# Patient Record
Sex: MALE | Race: WHITE | HISPANIC OR LATINO | Employment: OTHER | URBAN - METROPOLITAN AREA
[De-identification: names, ages, dates, MRNs, and addresses within clinical notes are randomized per-mention and may not be internally consistent; named-entity substitution may affect disease eponyms.]

---

## 2018-12-13 ENCOUNTER — CLINICAL SUPPORT (OUTPATIENT)
Dept: URGENT CARE | Facility: CLINIC | Age: 43
End: 2018-12-13

## 2018-12-13 DIAGNOSIS — Z02.1 ENCOUNTER FOR PRE-EMPLOYMENT EXAMINATION: ICD-10-CM

## 2018-12-13 PROCEDURE — 99499 UNLISTED E&M SERVICE: CPT | Mod: S$GLB,,, | Performed by: NURSE PRACTITIONER

## 2021-10-03 ENCOUNTER — HOSPITAL ENCOUNTER (EMERGENCY)
Facility: HOSPITAL | Age: 46
Discharge: HOME | End: 2021-10-03
Attending: EMERGENCY MEDICINE
Payer: COMMERCIAL

## 2021-10-03 VITALS
DIASTOLIC BLOOD PRESSURE: 99 MMHG | BODY MASS INDEX: 26.82 KG/M2 | SYSTOLIC BLOOD PRESSURE: 164 MMHG | WEIGHT: 161 LBS | OXYGEN SATURATION: 98 % | HEART RATE: 83 BPM | HEIGHT: 65 IN | RESPIRATION RATE: 17 BRPM | TEMPERATURE: 98 F

## 2021-10-03 DIAGNOSIS — S61.209A FLEXOR TENDON LACERATION OF FINGER WITH OPEN WOUND, INITIAL ENCOUNTER: ICD-10-CM

## 2021-10-03 DIAGNOSIS — S61.213A LACERATION OF LEFT MIDDLE FINGER WITHOUT FOREIGN BODY WITHOUT DAMAGE TO NAIL, INITIAL ENCOUNTER: Primary | ICD-10-CM

## 2021-10-03 DIAGNOSIS — S56.129A FLEXOR TENDON LACERATION OF FINGER WITH OPEN WOUND, INITIAL ENCOUNTER: ICD-10-CM

## 2021-10-03 PROCEDURE — 25000003 PHARM REV CODE 250: Performed by: NURSE PRACTITIONER

## 2021-10-03 PROCEDURE — 63600175 PHARM REV CODE 636 W HCPCS: Performed by: NURSE PRACTITIONER

## 2021-10-03 PROCEDURE — 25000003 PHARM REV CODE 250

## 2021-10-03 PROCEDURE — 90715 TDAP VACCINE 7 YRS/> IM: CPT | Performed by: NURSE PRACTITIONER

## 2021-10-03 PROCEDURE — 90471 IMMUNIZATION ADMIN: CPT | Performed by: NURSE PRACTITIONER

## 2021-10-03 PROCEDURE — 12001 RPR S/N/AX/GEN/TRNK 2.5CM/<: CPT

## 2021-10-03 PROCEDURE — 99284 EMERGENCY DEPT VISIT MOD MDM: CPT | Mod: 25

## 2021-10-03 RX ORDER — HYDROCODONE BITARTRATE AND ACETAMINOPHEN 5; 325 MG/1; MG/1
1 TABLET ORAL
Status: COMPLETED | OUTPATIENT
Start: 2021-10-03 | End: 2021-10-03

## 2021-10-03 RX ORDER — LIDOCAINE HYDROCHLORIDE 10 MG/ML
INJECTION, SOLUTION EPIDURAL; INFILTRATION; INTRACAUDAL; PERINEURAL
Status: COMPLETED
Start: 2021-10-03 | End: 2021-10-03

## 2021-10-03 RX ORDER — HYDROCODONE BITARTRATE AND ACETAMINOPHEN 5; 325 MG/1; MG/1
1 TABLET ORAL EVERY 6 HOURS PRN
Qty: 8 TABLET | Refills: 0 | Status: SHIPPED | OUTPATIENT
Start: 2021-10-03 | End: 2021-10-05

## 2021-10-03 RX ORDER — CEPHALEXIN 500 MG/1
500 CAPSULE ORAL
Status: COMPLETED | OUTPATIENT
Start: 2021-10-03 | End: 2021-10-03

## 2021-10-03 RX ORDER — LIDOCAINE HYDROCHLORIDE 10 MG/ML
5 INJECTION, SOLUTION EPIDURAL; INFILTRATION; INTRACAUDAL; PERINEURAL
Status: DISCONTINUED | OUTPATIENT
Start: 2021-10-03 | End: 2021-10-03

## 2021-10-03 RX ORDER — LIDOCAINE HYDROCHLORIDE 10 MG/ML
10 INJECTION, SOLUTION EPIDURAL; INFILTRATION; INTRACAUDAL; PERINEURAL
Status: COMPLETED | OUTPATIENT
Start: 2021-10-03 | End: 2021-10-03

## 2021-10-03 RX ORDER — CEPHALEXIN 500 MG/1
500 CAPSULE ORAL EVERY 8 HOURS
Qty: 21 CAPSULE | Refills: 0 | Status: SHIPPED | OUTPATIENT
Start: 2021-10-03 | End: 2021-10-10

## 2021-10-03 RX ADMIN — HYDROCODONE BITARTRATE AND ACETAMINOPHEN 1 TABLET: 5; 325 TABLET ORAL at 02:10

## 2021-10-03 RX ADMIN — LIDOCAINE HYDROCHLORIDE 100 MG: 10 INJECTION, SOLUTION EPIDURAL; INFILTRATION; INTRACAUDAL; PERINEURAL at 03:10

## 2021-10-03 RX ADMIN — CEPHALEXIN 500 MG: 500 CAPSULE ORAL at 04:10

## 2021-10-03 RX ADMIN — CLOSTRIDIUM TETANI TOXOID ANTIGEN (FORMALDEHYDE INACTIVATED), CORYNEBACTERIUM DIPHTHERIAE TOXOID ANTIGEN (FORMALDEHYDE INACTIVATED), BORDETELLA PERTUSSIS TOXOID ANTIGEN (GLUTARALDEHYDE INACTIVATED), BORDETELLA PERTUSSIS FILAMENTOUS HEMAGGLUTININ ANTIGEN (FORMALDEHYDE INACTIVATED), BORDETELLA PERTUSSIS PERTACTIN ANTIGEN, AND BORDETELLA PERTUSSIS FIMBRIAE 2/3 ANTIGEN 0.5 ML: 5; 2; 2.5; 5; 3; 5 INJECTION, SUSPENSION INTRAMUSCULAR at 03:10

## 2021-10-05 ENCOUNTER — TELEPHONE (OUTPATIENT)
Dept: ORTHOPEDICS | Facility: CLINIC | Age: 46
End: 2021-10-05

## 2021-10-07 ENCOUNTER — ANESTHESIA EVENT (OUTPATIENT)
Dept: SURGERY | Facility: HOSPITAL | Age: 46
End: 2021-10-07
Payer: COMMERCIAL

## 2021-10-07 ENCOUNTER — TELEPHONE (OUTPATIENT)
Dept: ORTHOPEDICS | Facility: CLINIC | Age: 46
End: 2021-10-07

## 2021-10-07 ENCOUNTER — OFFICE VISIT (OUTPATIENT)
Dept: ORTHOPEDICS | Facility: CLINIC | Age: 46
End: 2021-10-07
Payer: COMMERCIAL

## 2021-10-07 VITALS — WEIGHT: 161 LBS | BODY MASS INDEX: 27.15 KG/M2

## 2021-10-07 DIAGNOSIS — S61.209A FLEXOR TENDON LACERATION OF FINGER WITH OPEN WOUND, INITIAL ENCOUNTER: ICD-10-CM

## 2021-10-07 DIAGNOSIS — S61.213A LACERATION OF LEFT MIDDLE FINGER WITHOUT FOREIGN BODY WITHOUT DAMAGE TO NAIL, INITIAL ENCOUNTER: ICD-10-CM

## 2021-10-07 DIAGNOSIS — S56.129A FLEXOR TENDON LACERATION OF FINGER WITH OPEN WOUND, INITIAL ENCOUNTER: ICD-10-CM

## 2021-10-07 PROCEDURE — 99204 PR OFFICE/OUTPT VISIT, NEW, LEVL IV, 45-59 MIN: ICD-10-PCS | Mod: S$GLB,,, | Performed by: ORTHOPAEDIC SURGERY

## 2021-10-07 PROCEDURE — 99204 OFFICE O/P NEW MOD 45 MIN: CPT | Mod: S$GLB,,, | Performed by: ORTHOPAEDIC SURGERY

## 2021-10-07 PROCEDURE — 99999 PR PBB SHADOW E&M-EST. PATIENT-LVL III: CPT | Mod: PBBFAC,,, | Performed by: ORTHOPAEDIC SURGERY

## 2021-10-07 PROCEDURE — 1159F MED LIST DOCD IN RCRD: CPT | Mod: CPTII,S$GLB,, | Performed by: ORTHOPAEDIC SURGERY

## 2021-10-07 PROCEDURE — 1159F PR MEDICATION LIST DOCUMENTED IN MEDICAL RECORD: ICD-10-PCS | Mod: CPTII,S$GLB,, | Performed by: ORTHOPAEDIC SURGERY

## 2021-10-07 PROCEDURE — 3008F PR BODY MASS INDEX (BMI) DOCUMENTED: ICD-10-PCS | Mod: CPTII,S$GLB,, | Performed by: ORTHOPAEDIC SURGERY

## 2021-10-07 PROCEDURE — 99999 PR PBB SHADOW E&M-EST. PATIENT-LVL III: ICD-10-PCS | Mod: PBBFAC,,, | Performed by: ORTHOPAEDIC SURGERY

## 2021-10-07 PROCEDURE — 3008F BODY MASS INDEX DOCD: CPT | Mod: CPTII,S$GLB,, | Performed by: ORTHOPAEDIC SURGERY

## 2021-10-07 RX ORDER — OXYCODONE AND ACETAMINOPHEN 7.5; 325 MG/1; MG/1
1 TABLET ORAL EVERY 6 HOURS PRN
Qty: 40 TABLET | Refills: 0 | Status: ON HOLD | OUTPATIENT
Start: 2021-10-07 | End: 2021-11-13

## 2021-10-08 ENCOUNTER — ANESTHESIA (OUTPATIENT)
Dept: SURGERY | Facility: HOSPITAL | Age: 46
End: 2021-10-08
Payer: COMMERCIAL

## 2021-10-08 ENCOUNTER — HOSPITAL ENCOUNTER (OUTPATIENT)
Facility: HOSPITAL | Age: 46
Discharge: HOME | End: 2021-10-08
Attending: ORTHOPAEDIC SURGERY | Admitting: ORTHOPAEDIC SURGERY
Payer: COMMERCIAL

## 2021-10-08 VITALS
TEMPERATURE: 98 F | RESPIRATION RATE: 11 BRPM | DIASTOLIC BLOOD PRESSURE: 76 MMHG | WEIGHT: 161 LBS | HEART RATE: 64 BPM | HEIGHT: 67 IN | BODY MASS INDEX: 25.27 KG/M2 | SYSTOLIC BLOOD PRESSURE: 124 MMHG | OXYGEN SATURATION: 97 %

## 2021-10-08 DIAGNOSIS — S61.209A FLEXOR TENDON LACERATION OF FINGER WITH OPEN WOUND, INITIAL ENCOUNTER: ICD-10-CM

## 2021-10-08 DIAGNOSIS — S56.129A FLEXOR TENDON LACERATION OF FINGER WITH OPEN WOUND, INITIAL ENCOUNTER: ICD-10-CM

## 2021-10-08 DIAGNOSIS — S61.213A LACERATION OF LEFT MIDDLE FINGER WITHOUT FOREIGN BODY WITHOUT DAMAGE TO NAIL, INITIAL ENCOUNTER: ICD-10-CM

## 2021-10-08 LAB
ANION GAP SERPL CALC-SCNC: 11 MMOL/L (ref 8–16)
BUN SERPL-MCNC: 16 MG/DL (ref 6–20)
CALCIUM SERPL-MCNC: 9.7 MG/DL (ref 8.7–10.5)
CHLORIDE SERPL-SCNC: 103 MMOL/L (ref 95–110)
CO2 SERPL-SCNC: 23 MMOL/L (ref 23–29)
CREAT SERPL-MCNC: 1 MG/DL (ref 0.5–1.4)
EST. GFR  (AFRICAN AMERICAN): >60 ML/MIN/1.73 M^2
EST. GFR  (NON AFRICAN AMERICAN): >60 ML/MIN/1.73 M^2
GLUCOSE SERPL-MCNC: 100 MG/DL (ref 70–110)
GRAM STN SPEC: NORMAL
GRAM STN SPEC: NORMAL
POTASSIUM SERPL-SCNC: 4.4 MMOL/L (ref 3.5–5.1)
SARS-COV-2 RDRP RESP QL NAA+PROBE: NEGATIVE
SODIUM SERPL-SCNC: 137 MMOL/L (ref 136–145)

## 2021-10-08 PROCEDURE — 12001 PR RESUPERF WND BODY <2.5CM: ICD-10-PCS | Mod: 59,51,F1, | Performed by: ORTHOPAEDIC SURGERY

## 2021-10-08 PROCEDURE — 87205 SMEAR GRAM STAIN: CPT | Performed by: ORTHOPAEDIC SURGERY

## 2021-10-08 PROCEDURE — 87070 CULTURE OTHR SPECIMN AEROBIC: CPT | Performed by: ORTHOPAEDIC SURGERY

## 2021-10-08 PROCEDURE — 63600175 PHARM REV CODE 636 W HCPCS: Performed by: ORTHOPAEDIC SURGERY

## 2021-10-08 PROCEDURE — 76942 ECHO GUIDE FOR BIOPSY: CPT | Performed by: STUDENT IN AN ORGANIZED HEALTH CARE EDUCATION/TRAINING PROGRAM

## 2021-10-08 PROCEDURE — 63600175 PHARM REV CODE 636 W HCPCS: Performed by: NURSE ANESTHETIST, CERTIFIED REGISTERED

## 2021-10-08 PROCEDURE — 71000015 HC POSTOP RECOV 1ST HR: Performed by: ORTHOPAEDIC SURGERY

## 2021-10-08 PROCEDURE — 36000707: Performed by: ORTHOPAEDIC SURGERY

## 2021-10-08 PROCEDURE — 36415 COLL VENOUS BLD VENIPUNCTURE: CPT | Performed by: ORTHOPAEDIC SURGERY

## 2021-10-08 PROCEDURE — 64831 PR REPAIR OF DIGIT NERVE: ICD-10-PCS | Mod: 51,F2,, | Performed by: ORTHOPAEDIC SURGERY

## 2021-10-08 PROCEDURE — 64831 REPAIR OF DIGIT NERVE: CPT | Mod: 51,F2,, | Performed by: ORTHOPAEDIC SURGERY

## 2021-10-08 PROCEDURE — 80048 BASIC METABOLIC PNL TOTAL CA: CPT | Performed by: ORTHOPAEDIC SURGERY

## 2021-10-08 PROCEDURE — U0002 COVID-19 LAB TEST NON-CDC: HCPCS | Performed by: ORTHOPAEDIC SURGERY

## 2021-10-08 PROCEDURE — 87077 CULTURE AEROBIC IDENTIFY: CPT | Performed by: ORTHOPAEDIC SURGERY

## 2021-10-08 PROCEDURE — 25000003 PHARM REV CODE 250: Performed by: NURSE ANESTHETIST, CERTIFIED REGISTERED

## 2021-10-08 PROCEDURE — 26370 REPAIR FINGER/HAND TENDON: CPT | Mod: F2,,, | Performed by: ORTHOPAEDIC SURGERY

## 2021-10-08 PROCEDURE — 63600175 PHARM REV CODE 636 W HCPCS: Performed by: STUDENT IN AN ORGANIZED HEALTH CARE EDUCATION/TRAINING PROGRAM

## 2021-10-08 PROCEDURE — 26370 PR REPAIR PROFUNDUS TENDON,PRIMARY: ICD-10-PCS | Mod: F2,,, | Performed by: ORTHOPAEDIC SURGERY

## 2021-10-08 PROCEDURE — 87186 SC STD MICRODIL/AGAR DIL: CPT | Performed by: ORTHOPAEDIC SURGERY

## 2021-10-08 PROCEDURE — 12001 RPR S/N/AX/GEN/TRNK 2.5CM/<: CPT | Mod: 59,51,F1, | Performed by: ORTHOPAEDIC SURGERY

## 2021-10-08 PROCEDURE — 37000008 HC ANESTHESIA 1ST 15 MINUTES: Performed by: ORTHOPAEDIC SURGERY

## 2021-10-08 PROCEDURE — 87075 CULTR BACTERIA EXCEPT BLOOD: CPT | Performed by: ORTHOPAEDIC SURGERY

## 2021-10-08 PROCEDURE — 63600175 PHARM REV CODE 636 W HCPCS: Performed by: ANESTHESIOLOGY

## 2021-10-08 PROCEDURE — 36000706: Performed by: ORTHOPAEDIC SURGERY

## 2021-10-08 PROCEDURE — 37000009 HC ANESTHESIA EA ADD 15 MINS: Performed by: ORTHOPAEDIC SURGERY

## 2021-10-08 PROCEDURE — 71000016 HC POSTOP RECOV ADDL HR: Performed by: ORTHOPAEDIC SURGERY

## 2021-10-08 RX ORDER — FENTANYL CITRATE 50 UG/ML
INJECTION, SOLUTION INTRAMUSCULAR; INTRAVENOUS
Status: DISCONTINUED | OUTPATIENT
Start: 2021-10-08 | End: 2021-10-08

## 2021-10-08 RX ORDER — CEPHALEXIN 500 MG/1
500 CAPSULE ORAL 4 TIMES DAILY
Qty: 28 CAPSULE | Refills: 0 | Status: SHIPPED | OUTPATIENT
Start: 2021-10-08 | End: 2021-10-22 | Stop reason: SDUPTHER

## 2021-10-08 RX ORDER — OXYCODONE AND ACETAMINOPHEN 7.5; 325 MG/1; MG/1
1 TABLET ORAL EVERY 4 HOURS PRN
Qty: 30 TABLET | Refills: 0 | Status: ON HOLD | OUTPATIENT
Start: 2021-10-08 | End: 2021-11-13

## 2021-10-08 RX ORDER — ACETAMINOPHEN 325 MG/1
650 TABLET ORAL EVERY 4 HOURS PRN
Status: DISCONTINUED | OUTPATIENT
Start: 2021-10-08 | End: 2021-10-08 | Stop reason: HOSPADM

## 2021-10-08 RX ORDER — LIDOCAINE HYDROCHLORIDE 20 MG/ML
INJECTION INTRAVENOUS
Status: DISCONTINUED | OUTPATIENT
Start: 2021-10-08 | End: 2021-10-08

## 2021-10-08 RX ORDER — ROPIVACAINE HYDROCHLORIDE 5 MG/ML
INJECTION, SOLUTION EPIDURAL; INFILTRATION; PERINEURAL
Status: COMPLETED | OUTPATIENT
Start: 2021-10-08 | End: 2021-10-08

## 2021-10-08 RX ORDER — PROPOFOL 10 MG/ML
VIAL (ML) INTRAVENOUS
Status: DISCONTINUED | OUTPATIENT
Start: 2021-10-08 | End: 2021-10-08

## 2021-10-08 RX ORDER — CEFAZOLIN SODIUM 2 G/50ML
2 SOLUTION INTRAVENOUS
Status: DISCONTINUED | OUTPATIENT
Start: 2021-10-08 | End: 2021-10-08 | Stop reason: HOSPADM

## 2021-10-08 RX ORDER — DEXAMETHASONE SODIUM PHOSPHATE 4 MG/ML
INJECTION, SOLUTION INTRA-ARTICULAR; INTRALESIONAL; INTRAMUSCULAR; INTRAVENOUS; SOFT TISSUE
Status: DISCONTINUED | OUTPATIENT
Start: 2021-10-08 | End: 2021-10-08

## 2021-10-08 RX ORDER — LEVOTHYROXINE SODIUM 88 UG/1
88 TABLET ORAL
COMMUNITY

## 2021-10-08 RX ORDER — PROPOFOL 10 MG/ML
VIAL (ML) INTRAVENOUS CONTINUOUS PRN
Status: DISCONTINUED | OUTPATIENT
Start: 2021-10-08 | End: 2021-10-08

## 2021-10-08 RX ORDER — MIDAZOLAM HYDROCHLORIDE 1 MG/ML
INJECTION, SOLUTION INTRAMUSCULAR; INTRAVENOUS
Status: DISCONTINUED | OUTPATIENT
Start: 2021-10-08 | End: 2021-10-08

## 2021-10-08 RX ORDER — OXYCODONE HYDROCHLORIDE 5 MG/1
10 TABLET ORAL EVERY 4 HOURS PRN
Status: DISCONTINUED | OUTPATIENT
Start: 2021-10-08 | End: 2021-10-08 | Stop reason: HOSPADM

## 2021-10-08 RX ORDER — VANCOMYCIN HYDROCHLORIDE 1 G/20ML
INJECTION, POWDER, LYOPHILIZED, FOR SOLUTION INTRAVENOUS
Status: DISCONTINUED | OUTPATIENT
Start: 2021-10-08 | End: 2021-10-08 | Stop reason: HOSPADM

## 2021-10-08 RX ORDER — SODIUM CHLORIDE, SODIUM LACTATE, POTASSIUM CHLORIDE, CALCIUM CHLORIDE 600; 310; 30; 20 MG/100ML; MG/100ML; MG/100ML; MG/100ML
INJECTION, SOLUTION INTRAVENOUS CONTINUOUS
Status: DISCONTINUED | OUTPATIENT
Start: 2021-10-08 | End: 2021-10-08 | Stop reason: HOSPADM

## 2021-10-08 RX ORDER — KETOROLAC TROMETHAMINE 30 MG/ML
30 INJECTION, SOLUTION INTRAMUSCULAR; INTRAVENOUS ONCE
Status: COMPLETED | OUTPATIENT
Start: 2021-10-08 | End: 2021-10-08

## 2021-10-08 RX ORDER — ONDANSETRON 8 MG/1
8 TABLET, ORALLY DISINTEGRATING ORAL EVERY 8 HOURS PRN
Status: DISCONTINUED | OUTPATIENT
Start: 2021-10-08 | End: 2021-10-08 | Stop reason: HOSPADM

## 2021-10-08 RX ORDER — LIDOCAINE HYDROCHLORIDE 10 MG/ML
1 INJECTION, SOLUTION EPIDURAL; INFILTRATION; INTRACAUDAL; PERINEURAL ONCE
Status: DISCONTINUED | OUTPATIENT
Start: 2021-10-08 | End: 2021-10-08 | Stop reason: HOSPADM

## 2021-10-08 RX ADMIN — FENTANYL CITRATE 50 MCG: 50 INJECTION, SOLUTION INTRAMUSCULAR; INTRAVENOUS at 03:10

## 2021-10-08 RX ADMIN — GLYCOPYRROLATE 0.1 MG: 0.2 INJECTION, SOLUTION INTRAMUSCULAR; INTRAVITREAL at 02:10

## 2021-10-08 RX ADMIN — SODIUM CHLORIDE, SODIUM LACTATE, POTASSIUM CHLORIDE, AND CALCIUM CHLORIDE: .6; .31; .03; .02 INJECTION, SOLUTION INTRAVENOUS at 11:10

## 2021-10-08 RX ADMIN — ROPIVACAINE HYDROCHLORIDE 30 ML: 5 INJECTION, SOLUTION EPIDURAL; INFILTRATION; PERINEURAL at 02:10

## 2021-10-08 RX ADMIN — PROPOFOL 85 MCG/KG/MIN: 10 INJECTION, EMULSION INTRAVENOUS at 02:10

## 2021-10-08 RX ADMIN — KETOROLAC TROMETHAMINE 30 MG: 30 INJECTION, SOLUTION INTRAMUSCULAR; INTRAVENOUS at 05:10

## 2021-10-08 RX ADMIN — PROPOFOL 50 MG: 10 INJECTION, EMULSION INTRAVENOUS at 02:10

## 2021-10-08 RX ADMIN — DEXAMETHASONE SODIUM PHOSPHATE 8 MG: 4 INJECTION, SOLUTION INTRA-ARTICULAR; INTRALESIONAL; INTRAMUSCULAR; INTRAVENOUS; SOFT TISSUE at 02:10

## 2021-10-08 RX ADMIN — MIDAZOLAM 5 MG: 1 INJECTION INTRAMUSCULAR; INTRAVENOUS at 02:10

## 2021-10-08 RX ADMIN — CEFAZOLIN SODIUM 2 G: 2 SOLUTION INTRAVENOUS at 03:10

## 2021-10-08 RX ADMIN — LIDOCAINE HYDROCHLORIDE 75 MG: 20 INJECTION, SOLUTION INTRAVENOUS at 02:10

## 2021-10-08 RX ADMIN — MIDAZOLAM 1 MG: 1 INJECTION INTRAMUSCULAR; INTRAVENOUS at 03:10

## 2021-10-11 LAB — BACTERIA SPEC AEROBE CULT: ABNORMAL

## 2021-10-13 LAB — BACTERIA SPEC ANAEROBE CULT: NORMAL

## 2021-10-19 DIAGNOSIS — S61.209A FLEXOR TENDON LACERATION OF FINGER WITH OPEN WOUND, INITIAL ENCOUNTER: ICD-10-CM

## 2021-10-19 DIAGNOSIS — S61.213A LACERATION OF LEFT MIDDLE FINGER WITHOUT FOREIGN BODY WITHOUT DAMAGE TO NAIL, INITIAL ENCOUNTER: Primary | ICD-10-CM

## 2021-10-19 DIAGNOSIS — S56.129A FLEXOR TENDON LACERATION OF FINGER WITH OPEN WOUND, INITIAL ENCOUNTER: ICD-10-CM

## 2021-10-22 ENCOUNTER — OFFICE VISIT (OUTPATIENT)
Dept: ORTHOPEDICS | Facility: CLINIC | Age: 46
End: 2021-10-22
Payer: COMMERCIAL

## 2021-10-22 VITALS — WEIGHT: 161 LBS | BODY MASS INDEX: 25.22 KG/M2

## 2021-10-22 DIAGNOSIS — S64.40XD LACERATION OF DIGITAL NERVE OF FINGER, SUBSEQUENT ENCOUNTER: ICD-10-CM

## 2021-10-22 DIAGNOSIS — S61.209A FLEXOR TENDON LACERATION OF FINGER WITH OPEN WOUND, INITIAL ENCOUNTER: Primary | ICD-10-CM

## 2021-10-22 DIAGNOSIS — S56.129A FLEXOR TENDON LACERATION OF FINGER WITH OPEN WOUND, INITIAL ENCOUNTER: Primary | ICD-10-CM

## 2021-10-22 PROCEDURE — 3008F PR BODY MASS INDEX (BMI) DOCUMENTED: ICD-10-PCS | Mod: CPTII,S$GLB,, | Performed by: ORTHOPAEDIC SURGERY

## 2021-10-22 PROCEDURE — 1159F PR MEDICATION LIST DOCUMENTED IN MEDICAL RECORD: ICD-10-PCS | Mod: CPTII,S$GLB,, | Performed by: ORTHOPAEDIC SURGERY

## 2021-10-22 PROCEDURE — 1159F MED LIST DOCD IN RCRD: CPT | Mod: CPTII,S$GLB,, | Performed by: ORTHOPAEDIC SURGERY

## 2021-10-22 PROCEDURE — 99024 PR POST-OP FOLLOW-UP VISIT: ICD-10-PCS | Mod: S$GLB,,, | Performed by: ORTHOPAEDIC SURGERY

## 2021-10-22 PROCEDURE — 1160F PR REVIEW ALL MEDS BY PRESCRIBER/CLIN PHARMACIST DOCUMENTED: ICD-10-PCS | Mod: CPTII,S$GLB,, | Performed by: ORTHOPAEDIC SURGERY

## 2021-10-22 PROCEDURE — 3008F BODY MASS INDEX DOCD: CPT | Mod: CPTII,S$GLB,, | Performed by: ORTHOPAEDIC SURGERY

## 2021-10-22 PROCEDURE — 99024 POSTOP FOLLOW-UP VISIT: CPT | Mod: S$GLB,,, | Performed by: ORTHOPAEDIC SURGERY

## 2021-10-22 PROCEDURE — 99999 PR PBB SHADOW E&M-EST. PATIENT-LVL II: CPT | Mod: PBBFAC,,, | Performed by: ORTHOPAEDIC SURGERY

## 2021-10-22 PROCEDURE — 1160F RVW MEDS BY RX/DR IN RCRD: CPT | Mod: CPTII,S$GLB,, | Performed by: ORTHOPAEDIC SURGERY

## 2021-10-22 PROCEDURE — 99999 PR PBB SHADOW E&M-EST. PATIENT-LVL II: ICD-10-PCS | Mod: PBBFAC,,, | Performed by: ORTHOPAEDIC SURGERY

## 2021-10-22 RX ORDER — HYDROCODONE BITARTRATE AND ACETAMINOPHEN 5; 325 MG/1; MG/1
1 TABLET ORAL EVERY 6 HOURS PRN
Qty: 40 TABLET | Refills: 0 | Status: SHIPPED | OUTPATIENT
Start: 2021-10-22 | End: 2021-11-01

## 2021-10-22 RX ORDER — CEPHALEXIN 500 MG/1
500 CAPSULE ORAL 4 TIMES DAILY
Qty: 28 CAPSULE | Refills: 0 | Status: SHIPPED | OUTPATIENT
Start: 2021-10-22 | End: 2021-10-29

## 2021-10-25 ENCOUNTER — CLINICAL SUPPORT (OUTPATIENT)
Dept: REHABILITATION | Facility: HOSPITAL | Age: 46
End: 2021-10-25
Payer: COMMERCIAL

## 2021-10-25 ENCOUNTER — PATIENT MESSAGE (OUTPATIENT)
Dept: REHABILITATION | Facility: HOSPITAL | Age: 46
End: 2021-10-25

## 2021-10-25 DIAGNOSIS — M79.89 SWELLING OF LEFT HAND: ICD-10-CM

## 2021-10-25 DIAGNOSIS — M79.645 PAIN IN FINGER OF LEFT HAND: ICD-10-CM

## 2021-10-25 DIAGNOSIS — S61.209A FLEXOR TENDON LACERATION OF FINGER WITH OPEN WOUND, INITIAL ENCOUNTER: ICD-10-CM

## 2021-10-25 DIAGNOSIS — M25.642 DECREASED RANGE OF MOTION OF FINGER OF LEFT HAND: ICD-10-CM

## 2021-10-25 DIAGNOSIS — S56.129A FLEXOR TENDON LACERATION OF FINGER WITH OPEN WOUND, INITIAL ENCOUNTER: ICD-10-CM

## 2021-10-25 PROCEDURE — L3808 WHFO, RIGID W/O JOINTS: HCPCS | Mod: PN

## 2021-10-25 PROCEDURE — 97110 THERAPEUTIC EXERCISES: CPT | Mod: PN

## 2021-10-28 ENCOUNTER — OFFICE VISIT (OUTPATIENT)
Dept: ORTHOPEDICS | Facility: CLINIC | Age: 46
End: 2021-10-28
Payer: COMMERCIAL

## 2021-10-28 VITALS — WEIGHT: 160.94 LBS | BODY MASS INDEX: 25.26 KG/M2 | HEIGHT: 67 IN

## 2021-10-28 DIAGNOSIS — S61.213D LACERATION OF LEFT MIDDLE FINGER WITHOUT FOREIGN BODY WITHOUT DAMAGE TO NAIL, SUBSEQUENT ENCOUNTER: Primary | ICD-10-CM

## 2021-10-28 PROCEDURE — 3008F PR BODY MASS INDEX (BMI) DOCUMENTED: ICD-10-PCS | Mod: CPTII,S$GLB,, | Performed by: ORTHOPAEDIC SURGERY

## 2021-10-28 PROCEDURE — 99999 PR PBB SHADOW E&M-EST. PATIENT-LVL III: CPT | Mod: PBBFAC,,, | Performed by: ORTHOPAEDIC SURGERY

## 2021-10-28 PROCEDURE — 3008F BODY MASS INDEX DOCD: CPT | Mod: CPTII,S$GLB,, | Performed by: ORTHOPAEDIC SURGERY

## 2021-10-28 PROCEDURE — 99024 POSTOP FOLLOW-UP VISIT: CPT | Mod: S$GLB,,, | Performed by: ORTHOPAEDIC SURGERY

## 2021-10-28 PROCEDURE — 99999 PR PBB SHADOW E&M-EST. PATIENT-LVL III: ICD-10-PCS | Mod: PBBFAC,,, | Performed by: ORTHOPAEDIC SURGERY

## 2021-10-28 PROCEDURE — 1159F PR MEDICATION LIST DOCUMENTED IN MEDICAL RECORD: ICD-10-PCS | Mod: CPTII,S$GLB,, | Performed by: ORTHOPAEDIC SURGERY

## 2021-10-28 PROCEDURE — 1159F MED LIST DOCD IN RCRD: CPT | Mod: CPTII,S$GLB,, | Performed by: ORTHOPAEDIC SURGERY

## 2021-10-28 PROCEDURE — 99024 PR POST-OP FOLLOW-UP VISIT: ICD-10-PCS | Mod: S$GLB,,, | Performed by: ORTHOPAEDIC SURGERY

## 2021-10-28 RX ORDER — IBUPROFEN 600 MG/1
600 TABLET ORAL
Qty: 60 TABLET | Refills: 1 | Status: SHIPPED | OUTPATIENT
Start: 2021-10-28

## 2021-10-28 RX ORDER — MINOCYCLINE HYDROCHLORIDE 100 MG/1
100 CAPSULE ORAL 2 TIMES DAILY
Qty: 20 CAPSULE | Refills: 1 | Status: ON HOLD | OUTPATIENT
Start: 2021-10-28 | End: 2021-11-13 | Stop reason: SDUPTHER

## 2021-11-02 ENCOUNTER — CLINICAL SUPPORT (OUTPATIENT)
Dept: REHABILITATION | Facility: HOSPITAL | Age: 46
End: 2021-11-02
Payer: COMMERCIAL

## 2021-11-02 DIAGNOSIS — M79.89 SWELLING OF LEFT HAND: ICD-10-CM

## 2021-11-02 DIAGNOSIS — M25.642 DECREASED RANGE OF MOTION OF FINGER OF LEFT HAND: ICD-10-CM

## 2021-11-02 DIAGNOSIS — M79.645 PAIN IN FINGER OF LEFT HAND: ICD-10-CM

## 2021-11-02 PROCEDURE — 97140 MANUAL THERAPY 1/> REGIONS: CPT | Mod: PN

## 2021-11-02 PROCEDURE — 97110 THERAPEUTIC EXERCISES: CPT | Mod: PN,59

## 2021-11-02 PROCEDURE — 97165 OT EVAL LOW COMPLEX 30 MIN: CPT | Mod: PN

## 2021-11-02 PROCEDURE — 97763 ORTHC/PROSTC MGMT SBSQ ENC: CPT | Mod: PN

## 2021-11-03 ENCOUNTER — PATIENT MESSAGE (OUTPATIENT)
Dept: ORTHOPEDICS | Facility: CLINIC | Age: 46
End: 2021-11-03
Payer: COMMERCIAL

## 2021-11-04 ENCOUNTER — TELEPHONE (OUTPATIENT)
Dept: REHABILITATION | Facility: HOSPITAL | Age: 46
End: 2021-11-04
Payer: COMMERCIAL

## 2021-11-04 ENCOUNTER — OFFICE VISIT (OUTPATIENT)
Dept: ORTHOPEDICS | Facility: CLINIC | Age: 46
End: 2021-11-04
Payer: COMMERCIAL

## 2021-11-04 VITALS — WEIGHT: 160.94 LBS | BODY MASS INDEX: 25.26 KG/M2 | HEIGHT: 67 IN

## 2021-11-04 DIAGNOSIS — L08.9 FINGER INFECTION: ICD-10-CM

## 2021-11-04 PROCEDURE — 99999 PR PBB SHADOW E&M-EST. PATIENT-LVL III: CPT | Mod: PBBFAC,,, | Performed by: ORTHOPAEDIC SURGERY

## 2021-11-04 PROCEDURE — 1159F MED LIST DOCD IN RCRD: CPT | Mod: CPTII,S$GLB,, | Performed by: ORTHOPAEDIC SURGERY

## 2021-11-04 PROCEDURE — 1159F PR MEDICATION LIST DOCUMENTED IN MEDICAL RECORD: ICD-10-PCS | Mod: CPTII,S$GLB,, | Performed by: ORTHOPAEDIC SURGERY

## 2021-11-04 PROCEDURE — 3008F PR BODY MASS INDEX (BMI) DOCUMENTED: ICD-10-PCS | Mod: CPTII,S$GLB,, | Performed by: ORTHOPAEDIC SURGERY

## 2021-11-04 PROCEDURE — 99024 POSTOP FOLLOW-UP VISIT: CPT | Mod: S$GLB,,, | Performed by: ORTHOPAEDIC SURGERY

## 2021-11-04 PROCEDURE — 99999 PR PBB SHADOW E&M-EST. PATIENT-LVL III: ICD-10-PCS | Mod: PBBFAC,,, | Performed by: ORTHOPAEDIC SURGERY

## 2021-11-04 PROCEDURE — 3008F BODY MASS INDEX DOCD: CPT | Mod: CPTII,S$GLB,, | Performed by: ORTHOPAEDIC SURGERY

## 2021-11-04 PROCEDURE — 99024 PR POST-OP FOLLOW-UP VISIT: ICD-10-PCS | Mod: S$GLB,,, | Performed by: ORTHOPAEDIC SURGERY

## 2021-11-04 RX ORDER — MINOCYCLINE HYDROCHLORIDE 100 MG/1
100 CAPSULE ORAL 2 TIMES DAILY
Qty: 20 CAPSULE | Refills: 1 | Status: ON HOLD | OUTPATIENT
Start: 2021-11-04 | End: 2021-11-14 | Stop reason: HOSPADM

## 2021-11-05 ENCOUNTER — CLINICAL SUPPORT (OUTPATIENT)
Dept: REHABILITATION | Facility: HOSPITAL | Age: 46
End: 2021-11-05
Payer: COMMERCIAL

## 2021-11-05 DIAGNOSIS — M79.89 SWELLING OF LEFT HAND: ICD-10-CM

## 2021-11-05 DIAGNOSIS — M25.642 DECREASED RANGE OF MOTION OF FINGER OF LEFT HAND: ICD-10-CM

## 2021-11-05 DIAGNOSIS — M79.645 PAIN IN FINGER OF LEFT HAND: ICD-10-CM

## 2021-11-05 PROCEDURE — 97110 THERAPEUTIC EXERCISES: CPT | Mod: PN,59

## 2021-11-05 PROCEDURE — 97763 ORTHC/PROSTC MGMT SBSQ ENC: CPT | Mod: PN

## 2021-11-05 PROCEDURE — 97140 MANUAL THERAPY 1/> REGIONS: CPT | Mod: PN,59

## 2021-11-08 ENCOUNTER — CLINICAL SUPPORT (OUTPATIENT)
Dept: REHABILITATION | Facility: HOSPITAL | Age: 46
End: 2021-11-08
Payer: COMMERCIAL

## 2021-11-08 DIAGNOSIS — M79.645 PAIN IN FINGER OF LEFT HAND: ICD-10-CM

## 2021-11-08 DIAGNOSIS — M79.89 SWELLING OF LEFT HAND: ICD-10-CM

## 2021-11-08 DIAGNOSIS — M25.642 DECREASED RANGE OF MOTION OF FINGER OF LEFT HAND: ICD-10-CM

## 2021-11-08 PROCEDURE — 97110 THERAPEUTIC EXERCISES: CPT | Mod: PN

## 2021-11-08 PROCEDURE — 97140 MANUAL THERAPY 1/> REGIONS: CPT | Mod: PN,59

## 2021-11-10 ENCOUNTER — CLINICAL SUPPORT (OUTPATIENT)
Dept: REHABILITATION | Facility: HOSPITAL | Age: 46
End: 2021-11-10
Payer: COMMERCIAL

## 2021-11-10 DIAGNOSIS — M79.645 PAIN IN FINGER OF LEFT HAND: ICD-10-CM

## 2021-11-10 DIAGNOSIS — M25.642 DECREASED RANGE OF MOTION OF FINGER OF LEFT HAND: ICD-10-CM

## 2021-11-10 DIAGNOSIS — M79.89 SWELLING OF LEFT HAND: ICD-10-CM

## 2021-11-10 PROCEDURE — 97140 MANUAL THERAPY 1/> REGIONS: CPT | Mod: PN

## 2021-11-10 PROCEDURE — 97110 THERAPEUTIC EXERCISES: CPT | Mod: PN

## 2021-11-11 ENCOUNTER — HOSPITAL ENCOUNTER (OUTPATIENT)
Dept: RADIOLOGY | Facility: HOSPITAL | Age: 46
Discharge: HOME | DRG: 479 | End: 2021-11-11
Attending: ORTHOPAEDIC SURGERY
Payer: COMMERCIAL

## 2021-11-11 ENCOUNTER — OFFICE VISIT (OUTPATIENT)
Dept: ORTHOPEDICS | Facility: CLINIC | Age: 46
End: 2021-11-11
Payer: COMMERCIAL

## 2021-11-11 VITALS — BODY MASS INDEX: 25.06 KG/M2 | WEIGHT: 160 LBS

## 2021-11-11 DIAGNOSIS — M79.646 PAIN OF FINGER, UNSPECIFIED LATERALITY: ICD-10-CM

## 2021-11-11 DIAGNOSIS — L08.9 FINGER INFECTION: ICD-10-CM

## 2021-11-11 DIAGNOSIS — M79.646 PAIN OF FINGER, UNSPECIFIED LATERALITY: Primary | ICD-10-CM

## 2021-11-11 PROCEDURE — 99999 PR PBB SHADOW E&M-EST. PATIENT-LVL III: CPT | Mod: PBBFAC,,, | Performed by: ORTHOPAEDIC SURGERY

## 2021-11-11 PROCEDURE — 73140 XR FINGER 2 OR MORE VIEWS: ICD-10-PCS | Mod: 26,,, | Performed by: INTERNAL MEDICINE

## 2021-11-11 PROCEDURE — 99999 PR PBB SHADOW E&M-EST. PATIENT-LVL III: ICD-10-PCS | Mod: PBBFAC,,, | Performed by: ORTHOPAEDIC SURGERY

## 2021-11-11 PROCEDURE — 1159F MED LIST DOCD IN RCRD: CPT | Mod: CPTII,S$GLB,, | Performed by: ORTHOPAEDIC SURGERY

## 2021-11-11 PROCEDURE — 73140 X-RAY EXAM OF FINGER(S): CPT | Mod: 26,,, | Performed by: INTERNAL MEDICINE

## 2021-11-11 PROCEDURE — 99024 PR POST-OP FOLLOW-UP VISIT: ICD-10-PCS | Mod: S$GLB,,, | Performed by: ORTHOPAEDIC SURGERY

## 2021-11-11 PROCEDURE — 1159F PR MEDICATION LIST DOCUMENTED IN MEDICAL RECORD: ICD-10-PCS | Mod: CPTII,S$GLB,, | Performed by: ORTHOPAEDIC SURGERY

## 2021-11-11 PROCEDURE — 73140 X-RAY EXAM OF FINGER(S): CPT | Mod: TC,PN

## 2021-11-11 PROCEDURE — 3008F BODY MASS INDEX DOCD: CPT | Mod: CPTII,S$GLB,, | Performed by: ORTHOPAEDIC SURGERY

## 2021-11-11 PROCEDURE — 3008F PR BODY MASS INDEX (BMI) DOCUMENTED: ICD-10-PCS | Mod: CPTII,S$GLB,, | Performed by: ORTHOPAEDIC SURGERY

## 2021-11-11 PROCEDURE — 99024 POSTOP FOLLOW-UP VISIT: CPT | Mod: S$GLB,,, | Performed by: ORTHOPAEDIC SURGERY

## 2021-11-13 ENCOUNTER — ANESTHESIA EVENT (OUTPATIENT)
Dept: SURGERY | Facility: HOSPITAL | Age: 46
DRG: 479 | End: 2021-11-13
Payer: COMMERCIAL

## 2021-11-13 ENCOUNTER — HOSPITAL ENCOUNTER (INPATIENT)
Facility: HOSPITAL | Age: 46
LOS: 2 days | Discharge: HOME | DRG: 479 | End: 2021-11-15
Attending: EMERGENCY MEDICINE | Admitting: ORTHOPAEDIC SURGERY
Payer: COMMERCIAL

## 2021-11-13 ENCOUNTER — ANESTHESIA (OUTPATIENT)
Dept: SURGERY | Facility: HOSPITAL | Age: 46
DRG: 479 | End: 2021-11-13
Payer: COMMERCIAL

## 2021-11-13 DIAGNOSIS — L08.9 FINGER INFECTION: Primary | ICD-10-CM

## 2021-11-13 DIAGNOSIS — M86.9 OSTEOMYELITIS: ICD-10-CM

## 2021-11-13 LAB
ALBUMIN SERPL BCP-MCNC: 4.5 G/DL (ref 3.5–5.2)
ALP SERPL-CCNC: 68 U/L (ref 55–135)
ALT SERPL W/O P-5'-P-CCNC: 22 U/L (ref 10–44)
ANION GAP SERPL CALC-SCNC: 11 MMOL/L (ref 8–16)
AST SERPL-CCNC: 18 U/L (ref 10–40)
BASOPHILS # BLD AUTO: 0.04 K/UL (ref 0–0.2)
BASOPHILS NFR BLD: 0.5 % (ref 0–1.9)
BILIRUB SERPL-MCNC: 0.5 MG/DL (ref 0.1–1)
BUN SERPL-MCNC: 13 MG/DL (ref 6–20)
CALCIUM SERPL-MCNC: 9.6 MG/DL (ref 8.7–10.5)
CHLORIDE SERPL-SCNC: 107 MMOL/L (ref 95–110)
CO2 SERPL-SCNC: 23 MMOL/L (ref 23–29)
CREAT SERPL-MCNC: 0.9 MG/DL (ref 0.5–1.4)
CTP QC/QA: YES
DIFFERENTIAL METHOD: NORMAL
EOSINOPHIL # BLD AUTO: 0.1 K/UL (ref 0–0.5)
EOSINOPHIL NFR BLD: 1.2 % (ref 0–8)
ERYTHROCYTE [DISTWIDTH] IN BLOOD BY AUTOMATED COUNT: 11.7 % (ref 11.5–14.5)
EST. GFR  (AFRICAN AMERICAN): >60 ML/MIN/1.73 M^2
EST. GFR  (NON AFRICAN AMERICAN): >60 ML/MIN/1.73 M^2
GLUCOSE SERPL-MCNC: 97 MG/DL (ref 70–110)
GRAM STN SPEC: NORMAL
GRAM STN SPEC: NORMAL
HCT VFR BLD AUTO: 41.2 % (ref 40–54)
HGB BLD-MCNC: 14.5 G/DL (ref 14–18)
IMM GRANULOCYTES # BLD AUTO: 0.02 K/UL (ref 0–0.04)
IMM GRANULOCYTES NFR BLD AUTO: 0.3 % (ref 0–0.5)
LYMPHOCYTES # BLD AUTO: 2.1 K/UL (ref 1–4.8)
LYMPHOCYTES NFR BLD: 28.5 % (ref 18–48)
MCH RBC QN AUTO: 30.3 PG (ref 27–31)
MCHC RBC AUTO-ENTMCNC: 35.2 G/DL (ref 32–36)
MCV RBC AUTO: 86 FL (ref 82–98)
MONOCYTES # BLD AUTO: 0.4 K/UL (ref 0.3–1)
MONOCYTES NFR BLD: 5.6 % (ref 4–15)
NEUTROPHILS # BLD AUTO: 4.7 K/UL (ref 1.8–7.7)
NEUTROPHILS NFR BLD: 63.9 % (ref 38–73)
NRBC BLD-RTO: 0 /100 WBC
PLATELET # BLD AUTO: 251 K/UL (ref 150–450)
PMV BLD AUTO: 9.9 FL (ref 9.2–12.9)
POTASSIUM SERPL-SCNC: 4.4 MMOL/L (ref 3.5–5.1)
PROT SERPL-MCNC: 7.2 G/DL (ref 6–8.4)
RBC # BLD AUTO: 4.79 M/UL (ref 4.6–6.2)
SARS-COV-2 RDRP RESP QL NAA+PROBE: NEGATIVE
SODIUM SERPL-SCNC: 141 MMOL/L (ref 136–145)
WBC # BLD AUTO: 7.29 K/UL (ref 3.9–12.7)

## 2021-11-13 PROCEDURE — 37000008 HC ANESTHESIA 1ST 15 MINUTES: Performed by: ORTHOPAEDIC SURGERY

## 2021-11-13 PROCEDURE — 63600175 PHARM REV CODE 636 W HCPCS: Performed by: ORTHOPAEDIC SURGERY

## 2021-11-13 PROCEDURE — 87070 CULTURE OTHR SPECIMN AEROBIC: CPT | Performed by: ORTHOPAEDIC SURGERY

## 2021-11-13 PROCEDURE — 99285 EMERGENCY DEPT VISIT HI MDM: CPT | Mod: 25

## 2021-11-13 PROCEDURE — 88307 TISSUE EXAM BY PATHOLOGIST: CPT | Performed by: PATHOLOGY

## 2021-11-13 PROCEDURE — 26034 TREAT HAND BONE LESION: CPT | Mod: 78,F2,, | Performed by: ORTHOPAEDIC SURGERY

## 2021-11-13 PROCEDURE — 25000003 PHARM REV CODE 250: Performed by: STUDENT IN AN ORGANIZED HEALTH CARE EDUCATION/TRAINING PROGRAM

## 2021-11-13 PROCEDURE — 87077 CULTURE AEROBIC IDENTIFY: CPT | Performed by: ORTHOPAEDIC SURGERY

## 2021-11-13 PROCEDURE — 87075 CULTR BACTERIA EXCEPT BLOOD: CPT | Performed by: ORTHOPAEDIC SURGERY

## 2021-11-13 PROCEDURE — 85025 COMPLETE CBC W/AUTO DIFF WBC: CPT | Performed by: EMERGENCY MEDICINE

## 2021-11-13 PROCEDURE — 63600175 PHARM REV CODE 636 W HCPCS: Performed by: STUDENT IN AN ORGANIZED HEALTH CARE EDUCATION/TRAINING PROGRAM

## 2021-11-13 PROCEDURE — 88307 TISSUE EXAM BY PATHOLOGIST: CPT | Mod: 26,,, | Performed by: PATHOLOGY

## 2021-11-13 PROCEDURE — 87116 MYCOBACTERIA CULTURE: CPT | Performed by: ORTHOPAEDIC SURGERY

## 2021-11-13 PROCEDURE — 26034 PR INCIS DEEP FINGR/HAND BONE LESN: ICD-10-PCS | Mod: 78,F2,, | Performed by: ORTHOPAEDIC SURGERY

## 2021-11-13 PROCEDURE — 36569 INSJ PICC 5 YR+ W/O IMAGING: CPT

## 2021-11-13 PROCEDURE — 36000705 HC OR TIME LEV I EA ADD 15 MIN: Performed by: ORTHOPAEDIC SURGERY

## 2021-11-13 PROCEDURE — 87102 FUNGUS ISOLATION CULTURE: CPT | Performed by: ORTHOPAEDIC SURGERY

## 2021-11-13 PROCEDURE — 88311 DECALCIFY TISSUE: CPT | Performed by: PATHOLOGY

## 2021-11-13 PROCEDURE — U0002 COVID-19 LAB TEST NON-CDC: HCPCS | Performed by: ORTHOPAEDIC SURGERY

## 2021-11-13 PROCEDURE — 87186 SC STD MICRODIL/AGAR DIL: CPT | Mod: 59 | Performed by: ORTHOPAEDIC SURGERY

## 2021-11-13 PROCEDURE — 37000009 HC ANESTHESIA EA ADD 15 MINS: Performed by: ORTHOPAEDIC SURGERY

## 2021-11-13 PROCEDURE — 71000033 HC RECOVERY, INTIAL HOUR: Performed by: ORTHOPAEDIC SURGERY

## 2021-11-13 PROCEDURE — 11000001 HC ACUTE MED/SURG PRIVATE ROOM

## 2021-11-13 PROCEDURE — 80053 COMPREHEN METABOLIC PANEL: CPT | Performed by: EMERGENCY MEDICINE

## 2021-11-13 PROCEDURE — 87206 SMEAR FLUORESCENT/ACID STAI: CPT | Performed by: ORTHOPAEDIC SURGERY

## 2021-11-13 PROCEDURE — 88311 DECALCIFY TISSUE: CPT | Mod: 26,,, | Performed by: PATHOLOGY

## 2021-11-13 PROCEDURE — C1751 CATH, INF, PER/CENT/MIDLINE: HCPCS

## 2021-11-13 PROCEDURE — 88311 PR  DECALCIFY TISSUE: ICD-10-PCS | Mod: 26,,, | Performed by: PATHOLOGY

## 2021-11-13 PROCEDURE — 25000003 PHARM REV CODE 250: Performed by: ORTHOPAEDIC SURGERY

## 2021-11-13 PROCEDURE — 87205 SMEAR GRAM STAIN: CPT | Performed by: ORTHOPAEDIC SURGERY

## 2021-11-13 PROCEDURE — A4216 STERILE WATER/SALINE, 10 ML: HCPCS | Performed by: ORTHOPAEDIC SURGERY

## 2021-11-13 PROCEDURE — 88307 PR  SURG PATH,LEVEL V: ICD-10-PCS | Mod: 26,,, | Performed by: PATHOLOGY

## 2021-11-13 PROCEDURE — 36000704 HC OR TIME LEV I 1ST 15 MIN: Performed by: ORTHOPAEDIC SURGERY

## 2021-11-13 RX ORDER — IBUPROFEN 600 MG/1
600 TABLET ORAL
Status: DISCONTINUED | OUTPATIENT
Start: 2021-11-13 | End: 2021-11-15 | Stop reason: HOSPADM

## 2021-11-13 RX ORDER — PROPOFOL 10 MG/ML
VIAL (ML) INTRAVENOUS
Status: DISCONTINUED | OUTPATIENT
Start: 2021-11-13 | End: 2021-11-13

## 2021-11-13 RX ORDER — SODIUM CHLORIDE 0.9 % (FLUSH) 0.9 %
10 SYRINGE (ML) INJECTION EVERY 6 HOURS
Status: DISCONTINUED | OUTPATIENT
Start: 2021-11-13 | End: 2021-11-15 | Stop reason: HOSPADM

## 2021-11-13 RX ORDER — SODIUM CHLORIDE 0.9 % (FLUSH) 0.9 %
10 SYRINGE (ML) INJECTION
Status: DISCONTINUED | OUTPATIENT
Start: 2021-11-13 | End: 2021-11-15 | Stop reason: HOSPADM

## 2021-11-13 RX ORDER — HYDROMORPHONE HYDROCHLORIDE 2 MG/ML
0.5 INJECTION, SOLUTION INTRAMUSCULAR; INTRAVENOUS; SUBCUTANEOUS EVERY 5 MIN PRN
Status: DISCONTINUED | OUTPATIENT
Start: 2021-11-13 | End: 2021-11-13 | Stop reason: HOSPADM

## 2021-11-13 RX ORDER — LEVOTHYROXINE SODIUM 88 UG/1
88 TABLET ORAL
Status: DISCONTINUED | OUTPATIENT
Start: 2021-11-14 | End: 2021-11-15 | Stop reason: HOSPADM

## 2021-11-13 RX ORDER — CLINDAMYCIN PHOSPHATE 900 MG/50ML
900 INJECTION, SOLUTION INTRAVENOUS
Status: DISCONTINUED | OUTPATIENT
Start: 2021-11-13 | End: 2021-11-15 | Stop reason: HOSPADM

## 2021-11-13 RX ORDER — PROPOFOL 10 MG/ML
VIAL (ML) INTRAVENOUS CONTINUOUS PRN
Status: DISCONTINUED | OUTPATIENT
Start: 2021-11-13 | End: 2021-11-13

## 2021-11-13 RX ORDER — ONDANSETRON 2 MG/ML
4 INJECTION INTRAMUSCULAR; INTRAVENOUS DAILY PRN
Status: DISCONTINUED | OUTPATIENT
Start: 2021-11-13 | End: 2021-11-13 | Stop reason: HOSPADM

## 2021-11-13 RX ORDER — METOCLOPRAMIDE HYDROCHLORIDE 5 MG/ML
10 INJECTION INTRAMUSCULAR; INTRAVENOUS EVERY 10 MIN PRN
Status: DISCONTINUED | OUTPATIENT
Start: 2021-11-13 | End: 2021-11-13 | Stop reason: HOSPADM

## 2021-11-13 RX ORDER — OXYCODONE AND ACETAMINOPHEN 7.5; 325 MG/1; MG/1
1 TABLET ORAL EVERY 4 HOURS PRN
Status: DISCONTINUED | OUTPATIENT
Start: 2021-11-13 | End: 2021-11-15 | Stop reason: HOSPADM

## 2021-11-13 RX ORDER — KETOROLAC TROMETHAMINE 30 MG/ML
INJECTION, SOLUTION INTRAMUSCULAR; INTRAVENOUS
Status: DISCONTINUED | OUTPATIENT
Start: 2021-11-13 | End: 2021-11-13

## 2021-11-13 RX ORDER — HYDROCODONE BITARTRATE AND ACETAMINOPHEN 5; 325 MG/1; MG/1
1 TABLET ORAL EVERY 4 HOURS PRN
Status: DISCONTINUED | OUTPATIENT
Start: 2021-11-13 | End: 2021-11-15 | Stop reason: HOSPADM

## 2021-11-13 RX ORDER — CIPROFLOXACIN 2 MG/ML
400 INJECTION, SOLUTION INTRAVENOUS
Status: DISCONTINUED | OUTPATIENT
Start: 2021-11-13 | End: 2021-11-15 | Stop reason: HOSPADM

## 2021-11-13 RX ORDER — VANCOMYCIN HYDROCHLORIDE 1 G/20ML
INJECTION, POWDER, LYOPHILIZED, FOR SOLUTION INTRAVENOUS
Status: DISCONTINUED | OUTPATIENT
Start: 2021-11-13 | End: 2021-11-13 | Stop reason: HOSPADM

## 2021-11-13 RX ORDER — BUPIVACAINE HYDROCHLORIDE 5 MG/ML
INJECTION, SOLUTION EPIDURAL; INTRACAUDAL
Status: DISCONTINUED | OUTPATIENT
Start: 2021-11-13 | End: 2021-11-13 | Stop reason: HOSPADM

## 2021-11-13 RX ADMIN — PROPOFOL 30 MG: 10 INJECTION, EMULSION INTRAVENOUS at 12:11

## 2021-11-13 RX ADMIN — CEFOXITIN 2 G: 1 INJECTION, POWDER, FOR SOLUTION INTRAVENOUS at 12:11

## 2021-11-13 RX ADMIN — IBUPROFEN 600 MG: 600 TABLET, FILM COATED ORAL at 04:11

## 2021-11-13 RX ADMIN — SODIUM CHLORIDE, SODIUM LACTATE, POTASSIUM CHLORIDE, AND CALCIUM CHLORIDE: .6; .31; .03; .02 INJECTION, SOLUTION INTRAVENOUS at 12:11

## 2021-11-13 RX ADMIN — CIPROFLOXACIN 400 MG: 2 INJECTION, SOLUTION INTRAVENOUS at 03:11

## 2021-11-13 RX ADMIN — KETOROLAC TROMETHAMINE 30 MG: 30 INJECTION, SOLUTION INTRAMUSCULAR; INTRAVENOUS at 01:11

## 2021-11-13 RX ADMIN — PROPOFOL 20 MG: 10 INJECTION, EMULSION INTRAVENOUS at 01:11

## 2021-11-13 RX ADMIN — CLINDAMYCIN IN 5 PERCENT DEXTROSE 900 MG: 18 INJECTION, SOLUTION INTRAVENOUS at 10:11

## 2021-11-13 RX ADMIN — HYDROCODONE BITARTRATE AND ACETAMINOPHEN 1 TABLET: 5; 325 TABLET ORAL at 08:11

## 2021-11-13 RX ADMIN — PROPOFOL 20 MG: 10 INJECTION, EMULSION INTRAVENOUS at 12:11

## 2021-11-13 RX ADMIN — Medication 10 ML: at 05:11

## 2021-11-13 RX ADMIN — CLINDAMYCIN IN 5 PERCENT DEXTROSE 900 MG: 18 INJECTION, SOLUTION INTRAVENOUS at 04:11

## 2021-11-13 RX ADMIN — PROPOFOL 100 MCG/KG/MIN: 10 INJECTION, EMULSION INTRAVENOUS at 12:11

## 2021-11-14 PROCEDURE — 63600175 PHARM REV CODE 636 W HCPCS: Performed by: ORTHOPAEDIC SURGERY

## 2021-11-14 PROCEDURE — A4216 STERILE WATER/SALINE, 10 ML: HCPCS | Performed by: ORTHOPAEDIC SURGERY

## 2021-11-14 PROCEDURE — 94761 N-INVAS EAR/PLS OXIMETRY MLT: CPT

## 2021-11-14 PROCEDURE — 94760 N-INVAS EAR/PLS OXIMETRY 1: CPT

## 2021-11-14 PROCEDURE — 25000003 PHARM REV CODE 250: Performed by: ORTHOPAEDIC SURGERY

## 2021-11-14 PROCEDURE — 11000001 HC ACUTE MED/SURG PRIVATE ROOM

## 2021-11-14 RX ORDER — OXYCODONE AND ACETAMINOPHEN 7.5; 325 MG/1; MG/1
1 TABLET ORAL EVERY 6 HOURS PRN
Qty: 28 TABLET | Refills: 0 | Status: SHIPPED | OUTPATIENT
Start: 2021-11-14 | End: 2021-11-21

## 2021-11-14 RX ORDER — CIPROFLOXACIN 2 MG/ML
400 INJECTION, SOLUTION INTRAVENOUS EVERY 12 HOURS
Qty: 16800 ML | Refills: 0 | Status: SHIPPED | OUTPATIENT
Start: 2021-11-13 | End: 2021-11-14

## 2021-11-14 RX ORDER — OXYCODONE AND ACETAMINOPHEN 7.5; 325 MG/1; MG/1
1 TABLET ORAL EVERY 4 HOURS PRN
Qty: 28 TABLET | Refills: 0 | Status: SHIPPED | OUTPATIENT
Start: 2021-11-14

## 2021-11-14 RX ADMIN — CIPROFLOXACIN 400 MG: 2 INJECTION, SOLUTION INTRAVENOUS at 03:11

## 2021-11-14 RX ADMIN — Medication 10 ML: at 12:11

## 2021-11-14 RX ADMIN — CLINDAMYCIN IN 5 PERCENT DEXTROSE 900 MG: 18 INJECTION, SOLUTION INTRAVENOUS at 06:11

## 2021-11-14 RX ADMIN — Medication 10 ML: at 05:11

## 2021-11-14 RX ADMIN — Medication 10 ML: at 11:11

## 2021-11-14 RX ADMIN — HYDROCODONE BITARTRATE AND ACETAMINOPHEN 1 TABLET: 5; 325 TABLET ORAL at 04:11

## 2021-11-14 RX ADMIN — Medication 10 ML: at 06:11

## 2021-11-14 RX ADMIN — LEVOTHYROXINE SODIUM 88 MCG: 88 TABLET ORAL at 06:11

## 2021-11-14 RX ADMIN — CIPROFLOXACIN 400 MG: 2 INJECTION, SOLUTION INTRAVENOUS at 02:11

## 2021-11-14 RX ADMIN — CLINDAMYCIN IN 5 PERCENT DEXTROSE 900 MG: 18 INJECTION, SOLUTION INTRAVENOUS at 10:11

## 2021-11-14 RX ADMIN — OXYCODONE AND ACETAMINOPHEN 1 TABLET: 7.5; 325 TABLET ORAL at 11:11

## 2021-11-14 RX ADMIN — CLINDAMYCIN IN 5 PERCENT DEXTROSE 900 MG: 18 INJECTION, SOLUTION INTRAVENOUS at 03:11

## 2021-11-14 RX ADMIN — OXYCODONE AND ACETAMINOPHEN 1 TABLET: 7.5; 325 TABLET ORAL at 12:11

## 2021-11-14 RX ADMIN — OXYCODONE AND ACETAMINOPHEN 1 TABLET: 7.5; 325 TABLET ORAL at 04:11

## 2021-11-14 RX ADMIN — IBUPROFEN 600 MG: 600 TABLET, FILM COATED ORAL at 08:11

## 2021-11-14 RX ADMIN — IBUPROFEN 600 MG: 600 TABLET, FILM COATED ORAL at 05:11

## 2021-11-14 RX ADMIN — IBUPROFEN 600 MG: 600 TABLET, FILM COATED ORAL at 12:11

## 2021-11-15 ENCOUNTER — PATIENT MESSAGE (OUTPATIENT)
Dept: REHABILITATION | Facility: HOSPITAL | Age: 46
End: 2021-11-15
Payer: COMMERCIAL

## 2021-11-15 ENCOUNTER — TELEPHONE (OUTPATIENT)
Dept: ORTHOPEDICS | Facility: CLINIC | Age: 46
End: 2021-11-15
Payer: COMMERCIAL

## 2021-11-15 VITALS
SYSTOLIC BLOOD PRESSURE: 115 MMHG | OXYGEN SATURATION: 98 % | WEIGHT: 160.94 LBS | DIASTOLIC BLOOD PRESSURE: 74 MMHG | TEMPERATURE: 99 F | BODY MASS INDEX: 25.26 KG/M2 | HEIGHT: 67 IN | RESPIRATION RATE: 20 BRPM | HEART RATE: 69 BPM

## 2021-11-15 PROCEDURE — A4216 STERILE WATER/SALINE, 10 ML: HCPCS | Performed by: ORTHOPAEDIC SURGERY

## 2021-11-15 PROCEDURE — 25000003 PHARM REV CODE 250: Performed by: ORTHOPAEDIC SURGERY

## 2021-11-15 PROCEDURE — 63600175 PHARM REV CODE 636 W HCPCS: Performed by: ORTHOPAEDIC SURGERY

## 2021-11-15 RX ORDER — IBUPROFEN 600 MG/1
600 TABLET ORAL
Qty: 60 TABLET | Refills: 1 | Status: SHIPPED | OUTPATIENT
Start: 2021-11-15 | End: 2021-12-30

## 2021-11-15 RX ADMIN — CLINDAMYCIN IN 5 PERCENT DEXTROSE 900 MG: 18 INJECTION, SOLUTION INTRAVENOUS at 02:11

## 2021-11-15 RX ADMIN — CIPROFLOXACIN 400 MG: 2 INJECTION, SOLUTION INTRAVENOUS at 03:11

## 2021-11-15 RX ADMIN — IBUPROFEN 600 MG: 600 TABLET, FILM COATED ORAL at 07:11

## 2021-11-15 RX ADMIN — IBUPROFEN 600 MG: 600 TABLET, FILM COATED ORAL at 11:11

## 2021-11-15 RX ADMIN — LEVOTHYROXINE SODIUM 88 MCG: 88 TABLET ORAL at 05:11

## 2021-11-15 RX ADMIN — CLINDAMYCIN IN 5 PERCENT DEXTROSE 900 MG: 18 INJECTION, SOLUTION INTRAVENOUS at 06:11

## 2021-11-15 RX ADMIN — CIPROFLOXACIN 400 MG: 2 INJECTION, SOLUTION INTRAVENOUS at 02:11

## 2021-11-15 RX ADMIN — Medication 10 ML: at 05:11

## 2021-11-16 ENCOUNTER — DOCUMENTATION ONLY (OUTPATIENT)
Dept: ORTHOPEDICS | Facility: CLINIC | Age: 46
End: 2021-11-16
Payer: COMMERCIAL

## 2021-11-16 ENCOUNTER — PATIENT MESSAGE (OUTPATIENT)
Dept: REHABILITATION | Facility: HOSPITAL | Age: 46
End: 2021-11-16
Payer: COMMERCIAL

## 2021-11-16 DIAGNOSIS — M86.9 OSTEOMYELITIS, UNSPECIFIED SITE, UNSPECIFIED TYPE: Primary | ICD-10-CM

## 2021-11-16 PROCEDURE — G0180 MD CERTIFICATION HHA PATIENT: HCPCS | Mod: ,,, | Performed by: ORTHOPAEDIC SURGERY

## 2021-11-16 PROCEDURE — G0180 PR HOME HEALTH MD CERTIFICATION: ICD-10-PCS | Mod: ,,, | Performed by: ORTHOPAEDIC SURGERY

## 2021-11-17 ENCOUNTER — TELEPHONE (OUTPATIENT)
Dept: PRIMARY CARE CLINIC | Facility: CLINIC | Age: 46
End: 2021-11-17
Payer: COMMERCIAL

## 2021-11-17 ENCOUNTER — PATIENT MESSAGE (OUTPATIENT)
Dept: ORTHOPEDICS | Facility: CLINIC | Age: 46
End: 2021-11-17
Payer: COMMERCIAL

## 2021-11-17 LAB
BACTERIA SPEC AEROBE CULT: ABNORMAL
BACTERIA SPEC AEROBE CULT: ABNORMAL
BACTERIA SPEC ANAEROBE CULT: NORMAL

## 2021-11-19 ENCOUNTER — LAB VISIT (OUTPATIENT)
Dept: LAB | Facility: HOSPITAL | Age: 46
End: 2021-11-19
Attending: ORTHOPAEDIC SURGERY
Payer: COMMERCIAL

## 2021-11-19 DIAGNOSIS — M86.9 SAPHO SYNDROME: Primary | ICD-10-CM

## 2021-11-19 DIAGNOSIS — M65.9 SAPHO SYNDROME: Primary | ICD-10-CM

## 2021-11-19 DIAGNOSIS — L70.9 SAPHO SYNDROME: Primary | ICD-10-CM

## 2021-11-19 DIAGNOSIS — L40.3 SAPHO SYNDROME: Primary | ICD-10-CM

## 2021-11-19 DIAGNOSIS — M85.80 SAPHO SYNDROME: Primary | ICD-10-CM

## 2021-11-19 LAB
ALBUMIN SERPL BCP-MCNC: 4 G/DL (ref 3.5–5.2)
ALP SERPL-CCNC: 63 U/L (ref 55–135)
ALT SERPL W/O P-5'-P-CCNC: 15 U/L (ref 10–44)
ANION GAP SERPL CALC-SCNC: 8 MMOL/L (ref 8–16)
AST SERPL-CCNC: 14 U/L (ref 10–40)
BASOPHILS # BLD AUTO: 0.04 K/UL (ref 0–0.2)
BASOPHILS NFR BLD: 0.8 % (ref 0–1.9)
BILIRUB SERPL-MCNC: 0.4 MG/DL (ref 0.1–1)
BUN SERPL-MCNC: 10 MG/DL (ref 6–20)
CALCIUM SERPL-MCNC: 9.9 MG/DL (ref 8.7–10.5)
CHLORIDE SERPL-SCNC: 105 MMOL/L (ref 95–110)
CO2 SERPL-SCNC: 24 MMOL/L (ref 23–29)
CREAT SERPL-MCNC: 0.8 MG/DL (ref 0.5–1.4)
CRP SERPL-MCNC: 0.9 MG/L (ref 0–8.2)
DIFFERENTIAL METHOD: ABNORMAL
EOSINOPHIL # BLD AUTO: 0.2 K/UL (ref 0–0.5)
EOSINOPHIL NFR BLD: 3.4 % (ref 0–8)
ERYTHROCYTE [DISTWIDTH] IN BLOOD BY AUTOMATED COUNT: 11.9 % (ref 11.5–14.5)
ERYTHROCYTE [SEDIMENTATION RATE] IN BLOOD BY WESTERGREN METHOD: 6 MM/HR (ref 0–23)
EST. GFR  (AFRICAN AMERICAN): >60 ML/MIN/1.73 M^2
EST. GFR  (NON AFRICAN AMERICAN): >60 ML/MIN/1.73 M^2
GLUCOSE SERPL-MCNC: 88 MG/DL (ref 70–110)
HCT VFR BLD AUTO: 39.8 % (ref 40–54)
HGB BLD-MCNC: 13.5 G/DL (ref 14–18)
IMM GRANULOCYTES # BLD AUTO: 0.02 K/UL (ref 0–0.04)
IMM GRANULOCYTES NFR BLD AUTO: 0.4 % (ref 0–0.5)
LYMPHOCYTES # BLD AUTO: 1.5 K/UL (ref 1–4.8)
LYMPHOCYTES NFR BLD: 31.9 % (ref 18–48)
MCH RBC QN AUTO: 29.4 PG (ref 27–31)
MCHC RBC AUTO-ENTMCNC: 33.9 G/DL (ref 32–36)
MCV RBC AUTO: 87 FL (ref 82–98)
MONOCYTES # BLD AUTO: 0.3 K/UL (ref 0.3–1)
MONOCYTES NFR BLD: 5.5 % (ref 4–15)
NEUTROPHILS # BLD AUTO: 2.8 K/UL (ref 1.8–7.7)
NEUTROPHILS NFR BLD: 58 % (ref 38–73)
NRBC BLD-RTO: 0 /100 WBC
PLATELET # BLD AUTO: 258 K/UL (ref 150–450)
PMV BLD AUTO: 10.2 FL (ref 9.2–12.9)
POTASSIUM SERPL-SCNC: 4 MMOL/L (ref 3.5–5.1)
PROT SERPL-MCNC: 6.6 G/DL (ref 6–8.4)
RBC # BLD AUTO: 4.59 M/UL (ref 4.6–6.2)
SODIUM SERPL-SCNC: 137 MMOL/L (ref 136–145)
WBC # BLD AUTO: 4.76 K/UL (ref 3.9–12.7)

## 2021-11-19 PROCEDURE — 80053 COMPREHEN METABOLIC PANEL: CPT | Performed by: ORTHOPAEDIC SURGERY

## 2021-11-19 PROCEDURE — 85652 RBC SED RATE AUTOMATED: CPT | Performed by: ORTHOPAEDIC SURGERY

## 2021-11-19 PROCEDURE — 86140 C-REACTIVE PROTEIN: CPT | Performed by: ORTHOPAEDIC SURGERY

## 2021-11-19 PROCEDURE — 85025 COMPLETE CBC W/AUTO DIFF WBC: CPT | Performed by: ORTHOPAEDIC SURGERY

## 2021-11-22 ENCOUNTER — LAB VISIT (OUTPATIENT)
Dept: LAB | Facility: HOSPITAL | Age: 46
End: 2021-11-22
Attending: ORTHOPAEDIC SURGERY
Payer: COMMERCIAL

## 2021-11-22 ENCOUNTER — OFFICE VISIT (OUTPATIENT)
Dept: PRIMARY CARE CLINIC | Facility: CLINIC | Age: 46
End: 2021-11-22
Payer: COMMERCIAL

## 2021-11-22 ENCOUNTER — PATIENT MESSAGE (OUTPATIENT)
Dept: ORTHOPEDICS | Facility: CLINIC | Age: 46
End: 2021-11-22
Payer: COMMERCIAL

## 2021-11-22 VITALS
DIASTOLIC BLOOD PRESSURE: 87 MMHG | HEART RATE: 91 BPM | OXYGEN SATURATION: 98 % | TEMPERATURE: 98 F | BODY MASS INDEX: 25.35 KG/M2 | WEIGHT: 161.5 LBS | SYSTOLIC BLOOD PRESSURE: 133 MMHG | HEIGHT: 67 IN

## 2021-11-22 DIAGNOSIS — L40.3 SAPHO SYNDROME: Primary | ICD-10-CM

## 2021-11-22 DIAGNOSIS — M85.80 SAPHO SYNDROME: Primary | ICD-10-CM

## 2021-11-22 DIAGNOSIS — M86.9 SAPHO SYNDROME: Primary | ICD-10-CM

## 2021-11-22 DIAGNOSIS — M65.9 SAPHO SYNDROME: Primary | ICD-10-CM

## 2021-11-22 DIAGNOSIS — M86.9 OSTEOMYELITIS, UNSPECIFIED SITE, UNSPECIFIED TYPE: Primary | ICD-10-CM

## 2021-11-22 DIAGNOSIS — L70.9 SAPHO SYNDROME: Primary | ICD-10-CM

## 2021-11-22 DIAGNOSIS — L08.9 FINGER INFECTION: ICD-10-CM

## 2021-11-22 PROBLEM — M79.89 SWELLING OF LEFT HAND: Status: RESOLVED | Noted: 2021-10-25 | Resolved: 2021-11-22

## 2021-11-22 PROBLEM — M79.645 PAIN IN FINGER OF LEFT HAND: Status: RESOLVED | Noted: 2021-10-25 | Resolved: 2021-11-22

## 2021-11-22 LAB
ALBUMIN SERPL BCP-MCNC: 4 G/DL (ref 3.5–5.2)
ALP SERPL-CCNC: 67 U/L (ref 55–135)
ALT SERPL W/O P-5'-P-CCNC: 20 U/L (ref 10–44)
ANION GAP SERPL CALC-SCNC: 9 MMOL/L (ref 8–16)
AST SERPL-CCNC: 15 U/L (ref 10–40)
BASOPHILS # BLD AUTO: 0.05 K/UL (ref 0–0.2)
BASOPHILS NFR BLD: 1 % (ref 0–1.9)
BILIRUB SERPL-MCNC: 0.4 MG/DL (ref 0.1–1)
BUN SERPL-MCNC: 9 MG/DL (ref 6–20)
CALCIUM SERPL-MCNC: 10 MG/DL (ref 8.7–10.5)
CHLORIDE SERPL-SCNC: 106 MMOL/L (ref 95–110)
CO2 SERPL-SCNC: 24 MMOL/L (ref 23–29)
CREAT SERPL-MCNC: 0.8 MG/DL (ref 0.5–1.4)
CRP SERPL-MCNC: 0.7 MG/L (ref 0–8.2)
DIFFERENTIAL METHOD: ABNORMAL
EOSINOPHIL # BLD AUTO: 0.2 K/UL (ref 0–0.5)
EOSINOPHIL NFR BLD: 3.5 % (ref 0–8)
ERYTHROCYTE [DISTWIDTH] IN BLOOD BY AUTOMATED COUNT: 12.2 % (ref 11.5–14.5)
ERYTHROCYTE [SEDIMENTATION RATE] IN BLOOD BY WESTERGREN METHOD: <2 MM/HR (ref 0–23)
EST. GFR  (AFRICAN AMERICAN): >60 ML/MIN/1.73 M^2
EST. GFR  (NON AFRICAN AMERICAN): >60 ML/MIN/1.73 M^2
FINAL PATHOLOGIC DIAGNOSIS: NORMAL
GLUCOSE SERPL-MCNC: 73 MG/DL (ref 70–110)
GROSS: NORMAL
HCT VFR BLD AUTO: 40.3 % (ref 40–54)
HGB BLD-MCNC: 13.6 G/DL (ref 14–18)
IMM GRANULOCYTES # BLD AUTO: 0.01 K/UL (ref 0–0.04)
IMM GRANULOCYTES NFR BLD AUTO: 0.2 % (ref 0–0.5)
LYMPHOCYTES # BLD AUTO: 1.6 K/UL (ref 1–4.8)
LYMPHOCYTES NFR BLD: 32.2 % (ref 18–48)
Lab: NORMAL
MCH RBC QN AUTO: 29 PG (ref 27–31)
MCHC RBC AUTO-ENTMCNC: 33.7 G/DL (ref 32–36)
MCV RBC AUTO: 86 FL (ref 82–98)
MONOCYTES # BLD AUTO: 0.3 K/UL (ref 0.3–1)
MONOCYTES NFR BLD: 6.5 % (ref 4–15)
NEUTROPHILS # BLD AUTO: 2.8 K/UL (ref 1.8–7.7)
NEUTROPHILS NFR BLD: 56.6 % (ref 38–73)
NRBC BLD-RTO: 0 /100 WBC
PLATELET # BLD AUTO: 296 K/UL (ref 150–450)
PMV BLD AUTO: 9.8 FL (ref 9.2–12.9)
POTASSIUM SERPL-SCNC: 4.2 MMOL/L (ref 3.5–5.1)
PROT SERPL-MCNC: 6.6 G/DL (ref 6–8.4)
RBC # BLD AUTO: 4.69 M/UL (ref 4.6–6.2)
SODIUM SERPL-SCNC: 139 MMOL/L (ref 136–145)
WBC # BLD AUTO: 4.91 K/UL (ref 3.9–12.7)

## 2021-11-22 PROCEDURE — 85652 RBC SED RATE AUTOMATED: CPT | Performed by: ORTHOPAEDIC SURGERY

## 2021-11-22 PROCEDURE — 99205 PR OFFICE/OUTPT VISIT, NEW, LEVL V, 60-74 MIN: ICD-10-PCS | Mod: S$GLB,,, | Performed by: INTERNAL MEDICINE

## 2021-11-22 PROCEDURE — 99999 PR PBB SHADOW E&M-EST. PATIENT-LVL III: ICD-10-PCS | Mod: PBBFAC,,, | Performed by: INTERNAL MEDICINE

## 2021-11-22 PROCEDURE — 99205 OFFICE O/P NEW HI 60 MIN: CPT | Mod: S$GLB,,, | Performed by: INTERNAL MEDICINE

## 2021-11-22 PROCEDURE — 86140 C-REACTIVE PROTEIN: CPT | Performed by: ORTHOPAEDIC SURGERY

## 2021-11-22 PROCEDURE — 80053 COMPREHEN METABOLIC PANEL: CPT | Performed by: ORTHOPAEDIC SURGERY

## 2021-11-22 PROCEDURE — 85025 COMPLETE CBC W/AUTO DIFF WBC: CPT | Performed by: ORTHOPAEDIC SURGERY

## 2021-11-22 PROCEDURE — 99999 PR PBB SHADOW E&M-EST. PATIENT-LVL III: CPT | Mod: PBBFAC,,, | Performed by: INTERNAL MEDICINE

## 2021-11-26 ENCOUNTER — PATIENT MESSAGE (OUTPATIENT)
Dept: REHABILITATION | Facility: HOSPITAL | Age: 46
End: 2021-11-26
Payer: COMMERCIAL

## 2021-11-26 ENCOUNTER — OFFICE VISIT (OUTPATIENT)
Dept: ORTHOPEDICS | Facility: CLINIC | Age: 46
End: 2021-11-26
Payer: COMMERCIAL

## 2021-11-26 VITALS — HEIGHT: 67 IN | WEIGHT: 161.38 LBS | BODY MASS INDEX: 25.33 KG/M2

## 2021-11-26 DIAGNOSIS — S61.209A FLEXOR TENDON LACERATION OF FINGER WITH OPEN WOUND, INITIAL ENCOUNTER: Primary | ICD-10-CM

## 2021-11-26 DIAGNOSIS — S64.40XD LACERATION OF DIGITAL NERVE OF FINGER, SUBSEQUENT ENCOUNTER: ICD-10-CM

## 2021-11-26 DIAGNOSIS — S56.129A FLEXOR TENDON LACERATION OF FINGER WITH OPEN WOUND, INITIAL ENCOUNTER: Primary | ICD-10-CM

## 2021-11-26 DIAGNOSIS — M86.9 OSTEOMYELITIS, UNSPECIFIED SITE, UNSPECIFIED TYPE: ICD-10-CM

## 2021-11-26 PROCEDURE — 99999 PR PBB SHADOW E&M-EST. PATIENT-LVL III: ICD-10-PCS | Mod: PBBFAC,,, | Performed by: ORTHOPAEDIC SURGERY

## 2021-11-26 PROCEDURE — 99024 PR POST-OP FOLLOW-UP VISIT: ICD-10-PCS | Mod: S$GLB,,, | Performed by: ORTHOPAEDIC SURGERY

## 2021-11-26 PROCEDURE — 99999 PR PBB SHADOW E&M-EST. PATIENT-LVL III: CPT | Mod: PBBFAC,,, | Performed by: ORTHOPAEDIC SURGERY

## 2021-11-26 PROCEDURE — 99024 POSTOP FOLLOW-UP VISIT: CPT | Mod: S$GLB,,, | Performed by: ORTHOPAEDIC SURGERY

## 2021-12-01 ENCOUNTER — PATIENT MESSAGE (OUTPATIENT)
Dept: ORTHOPEDICS | Facility: CLINIC | Age: 46
End: 2021-12-01
Payer: COMMERCIAL

## 2021-12-01 ENCOUNTER — LAB VISIT (OUTPATIENT)
Dept: LAB | Facility: HOSPITAL | Age: 46
End: 2021-12-01
Attending: ORTHOPAEDIC SURGERY
Payer: COMMERCIAL

## 2021-12-01 DIAGNOSIS — M86.8X4 OTHER OSTEOMYELITIS, HAND: Primary | ICD-10-CM

## 2021-12-01 LAB
ALBUMIN SERPL BCP-MCNC: 4.1 G/DL (ref 3.5–5.2)
ALP SERPL-CCNC: 60 U/L (ref 55–135)
ALT SERPL W/O P-5'-P-CCNC: 58 U/L (ref 10–44)
ANION GAP SERPL CALC-SCNC: 13 MMOL/L (ref 8–16)
AST SERPL-CCNC: 33 U/L (ref 10–40)
BASOPHILS # BLD AUTO: 0.04 K/UL (ref 0–0.2)
BASOPHILS NFR BLD: 0.9 % (ref 0–1.9)
BILIRUB SERPL-MCNC: 0.5 MG/DL (ref 0.1–1)
BUN SERPL-MCNC: 11 MG/DL (ref 6–20)
CALCIUM SERPL-MCNC: 9.3 MG/DL (ref 8.7–10.5)
CHLORIDE SERPL-SCNC: 106 MMOL/L (ref 95–110)
CO2 SERPL-SCNC: 22 MMOL/L (ref 23–29)
CREAT SERPL-MCNC: 0.9 MG/DL (ref 0.5–1.4)
CRP SERPL-MCNC: 0.7 MG/L (ref 0–8.2)
DIFFERENTIAL METHOD: ABNORMAL
EOSINOPHIL # BLD AUTO: 0.2 K/UL (ref 0–0.5)
EOSINOPHIL NFR BLD: 3.8 % (ref 0–8)
ERYTHROCYTE [DISTWIDTH] IN BLOOD BY AUTOMATED COUNT: 12.1 % (ref 11.5–14.5)
ERYTHROCYTE [SEDIMENTATION RATE] IN BLOOD BY WESTERGREN METHOD: <2 MM/HR (ref 0–23)
EST. GFR  (AFRICAN AMERICAN): >60 ML/MIN/1.73 M^2
EST. GFR  (NON AFRICAN AMERICAN): >60 ML/MIN/1.73 M^2
GLUCOSE SERPL-MCNC: 102 MG/DL (ref 70–110)
HCT VFR BLD AUTO: 39.8 % (ref 40–54)
HGB BLD-MCNC: 13.4 G/DL (ref 14–18)
IMM GRANULOCYTES # BLD AUTO: 0.02 K/UL (ref 0–0.04)
IMM GRANULOCYTES NFR BLD AUTO: 0.5 % (ref 0–0.5)
LYMPHOCYTES # BLD AUTO: 1.5 K/UL (ref 1–4.8)
LYMPHOCYTES NFR BLD: 34 % (ref 18–48)
MCH RBC QN AUTO: 29.4 PG (ref 27–31)
MCHC RBC AUTO-ENTMCNC: 33.7 G/DL (ref 32–36)
MCV RBC AUTO: 87 FL (ref 82–98)
MONOCYTES # BLD AUTO: 0.3 K/UL (ref 0.3–1)
MONOCYTES NFR BLD: 7.4 % (ref 4–15)
NEUTROPHILS # BLD AUTO: 2.4 K/UL (ref 1.8–7.7)
NEUTROPHILS NFR BLD: 53.4 % (ref 38–73)
NRBC BLD-RTO: 0 /100 WBC
PLATELET # BLD AUTO: 296 K/UL (ref 150–450)
PMV BLD AUTO: 10.6 FL (ref 9.2–12.9)
POTASSIUM SERPL-SCNC: 4.2 MMOL/L (ref 3.5–5.1)
PROT SERPL-MCNC: 6.5 G/DL (ref 6–8.4)
RBC # BLD AUTO: 4.56 M/UL (ref 4.6–6.2)
SODIUM SERPL-SCNC: 141 MMOL/L (ref 136–145)
WBC # BLD AUTO: 4.44 K/UL (ref 3.9–12.7)

## 2021-12-01 PROCEDURE — 80053 COMPREHEN METABOLIC PANEL: CPT | Performed by: ORTHOPAEDIC SURGERY

## 2021-12-01 PROCEDURE — 86140 C-REACTIVE PROTEIN: CPT | Performed by: ORTHOPAEDIC SURGERY

## 2021-12-01 PROCEDURE — 85025 COMPLETE CBC W/AUTO DIFF WBC: CPT | Performed by: ORTHOPAEDIC SURGERY

## 2021-12-01 PROCEDURE — 85652 RBC SED RATE AUTOMATED: CPT | Performed by: ORTHOPAEDIC SURGERY

## 2021-12-06 ENCOUNTER — LAB VISIT (OUTPATIENT)
Dept: LAB | Facility: HOSPITAL | Age: 46
End: 2021-12-06
Attending: ORTHOPAEDIC SURGERY
Payer: COMMERCIAL

## 2021-12-06 ENCOUNTER — EXTERNAL HOME HEALTH (OUTPATIENT)
Dept: HOME HEALTH SERVICES | Facility: HOSPITAL | Age: 46
End: 2021-12-06
Payer: COMMERCIAL

## 2021-12-06 DIAGNOSIS — M86.8X4 OTHER OSTEOMYELITIS, HAND: Primary | ICD-10-CM

## 2021-12-06 LAB
ALBUMIN SERPL BCP-MCNC: 4.1 G/DL (ref 3.5–5.2)
ALP SERPL-CCNC: 61 U/L (ref 55–135)
ALT SERPL W/O P-5'-P-CCNC: 49 U/L (ref 10–44)
ANION GAP SERPL CALC-SCNC: 12 MMOL/L (ref 8–16)
AST SERPL-CCNC: 23 U/L (ref 10–40)
BASOPHILS # BLD AUTO: 0.05 K/UL (ref 0–0.2)
BASOPHILS NFR BLD: 1.3 % (ref 0–1.9)
BILIRUB SERPL-MCNC: 0.7 MG/DL (ref 0.1–1)
BUN SERPL-MCNC: 12 MG/DL (ref 6–20)
CALCIUM SERPL-MCNC: 9.5 MG/DL (ref 8.7–10.5)
CHLORIDE SERPL-SCNC: 107 MMOL/L (ref 95–110)
CO2 SERPL-SCNC: 22 MMOL/L (ref 23–29)
CREAT SERPL-MCNC: 0.9 MG/DL (ref 0.5–1.4)
CRP SERPL-MCNC: 0.5 MG/L (ref 0–8.2)
DIFFERENTIAL METHOD: ABNORMAL
EOSINOPHIL # BLD AUTO: 0.1 K/UL (ref 0–0.5)
EOSINOPHIL NFR BLD: 2.5 % (ref 0–8)
ERYTHROCYTE [DISTWIDTH] IN BLOOD BY AUTOMATED COUNT: 12.5 % (ref 11.5–14.5)
ERYTHROCYTE [SEDIMENTATION RATE] IN BLOOD BY WESTERGREN METHOD: <2 MM/HR (ref 0–23)
EST. GFR  (AFRICAN AMERICAN): >60 ML/MIN/1.73 M^2
EST. GFR  (NON AFRICAN AMERICAN): >60 ML/MIN/1.73 M^2
GLUCOSE SERPL-MCNC: 77 MG/DL (ref 70–110)
HCT VFR BLD AUTO: 40.2 % (ref 40–54)
HGB BLD-MCNC: 13.6 G/DL (ref 14–18)
IMM GRANULOCYTES # BLD AUTO: 0 K/UL (ref 0–0.04)
IMM GRANULOCYTES NFR BLD AUTO: 0 % (ref 0–0.5)
LYMPHOCYTES # BLD AUTO: 1.4 K/UL (ref 1–4.8)
LYMPHOCYTES NFR BLD: 35.3 % (ref 18–48)
MCH RBC QN AUTO: 30.2 PG (ref 27–31)
MCHC RBC AUTO-ENTMCNC: 33.8 G/DL (ref 32–36)
MCV RBC AUTO: 89 FL (ref 82–98)
MONOCYTES # BLD AUTO: 0.3 K/UL (ref 0.3–1)
MONOCYTES NFR BLD: 7.3 % (ref 4–15)
NEUTROPHILS # BLD AUTO: 2.2 K/UL (ref 1.8–7.7)
NEUTROPHILS NFR BLD: 53.6 % (ref 38–73)
NRBC BLD-RTO: 0 /100 WBC
PLATELET # BLD AUTO: 294 K/UL (ref 150–450)
PMV BLD AUTO: 10.3 FL (ref 9.2–12.9)
POTASSIUM SERPL-SCNC: 4.4 MMOL/L (ref 3.5–5.1)
PROT SERPL-MCNC: 6.5 G/DL (ref 6–8.4)
RBC # BLD AUTO: 4.51 M/UL (ref 4.6–6.2)
SODIUM SERPL-SCNC: 141 MMOL/L (ref 136–145)
WBC # BLD AUTO: 4 K/UL (ref 3.9–12.7)

## 2021-12-06 PROCEDURE — 85652 RBC SED RATE AUTOMATED: CPT | Performed by: ORTHOPAEDIC SURGERY

## 2021-12-06 PROCEDURE — 85025 COMPLETE CBC W/AUTO DIFF WBC: CPT | Performed by: ORTHOPAEDIC SURGERY

## 2021-12-06 PROCEDURE — 80053 COMPREHEN METABOLIC PANEL: CPT | Performed by: ORTHOPAEDIC SURGERY

## 2021-12-06 PROCEDURE — 86140 C-REACTIVE PROTEIN: CPT | Performed by: ORTHOPAEDIC SURGERY

## 2021-12-09 ENCOUNTER — HOSPITAL ENCOUNTER (OUTPATIENT)
Dept: RADIOLOGY | Facility: HOSPITAL | Age: 46
Discharge: HOME | End: 2021-12-09
Attending: ORTHOPAEDIC SURGERY
Payer: COMMERCIAL

## 2021-12-09 ENCOUNTER — OFFICE VISIT (OUTPATIENT)
Dept: ORTHOPEDICS | Facility: CLINIC | Age: 46
End: 2021-12-09
Payer: COMMERCIAL

## 2021-12-09 VITALS — WEIGHT: 161.38 LBS | BODY MASS INDEX: 25.33 KG/M2 | HEIGHT: 67 IN

## 2021-12-09 DIAGNOSIS — M86.9 OSTEOMYELITIS, UNSPECIFIED SITE, UNSPECIFIED TYPE: ICD-10-CM

## 2021-12-09 DIAGNOSIS — M79.646 PAIN OF FINGER, UNSPECIFIED LATERALITY: ICD-10-CM

## 2021-12-09 DIAGNOSIS — M79.646 PAIN OF FINGER, UNSPECIFIED LATERALITY: Primary | ICD-10-CM

## 2021-12-09 PROCEDURE — 99024 PR POST-OP FOLLOW-UP VISIT: ICD-10-PCS | Mod: S$GLB,,, | Performed by: ORTHOPAEDIC SURGERY

## 2021-12-09 PROCEDURE — 73140 XR FINGER 2 OR MORE VIEWS: ICD-10-PCS | Mod: 26,,, | Performed by: RADIOLOGY

## 2021-12-09 PROCEDURE — 99024 POSTOP FOLLOW-UP VISIT: CPT | Mod: S$GLB,,, | Performed by: ORTHOPAEDIC SURGERY

## 2021-12-09 PROCEDURE — 99999 PR PBB SHADOW E&M-EST. PATIENT-LVL III: ICD-10-PCS | Mod: PBBFAC,,, | Performed by: ORTHOPAEDIC SURGERY

## 2021-12-09 PROCEDURE — 99999 PR PBB SHADOW E&M-EST. PATIENT-LVL III: CPT | Mod: PBBFAC,,, | Performed by: ORTHOPAEDIC SURGERY

## 2021-12-09 PROCEDURE — 73140 X-RAY EXAM OF FINGER(S): CPT | Mod: TC,PN

## 2021-12-09 PROCEDURE — 73140 X-RAY EXAM OF FINGER(S): CPT | Mod: 26,,, | Performed by: RADIOLOGY

## 2021-12-09 RX ORDER — CLOTRIMAZOLE AND BETAMETHASONE DIPROPIONATE 10; .64 MG/G; MG/G
CREAM TOPICAL 2 TIMES DAILY
Qty: 1 EACH | Refills: 1 | Status: SHIPPED | OUTPATIENT
Start: 2021-12-09

## 2021-12-10 ENCOUNTER — DOCUMENT SCAN (OUTPATIENT)
Dept: HOME HEALTH SERVICES | Facility: HOSPITAL | Age: 46
End: 2021-12-10
Payer: COMMERCIAL

## 2021-12-13 ENCOUNTER — LAB VISIT (OUTPATIENT)
Dept: LAB | Facility: HOSPITAL | Age: 46
End: 2021-12-13
Attending: ORTHOPAEDIC SURGERY
Payer: COMMERCIAL

## 2021-12-13 DIAGNOSIS — M86.8X4 OTHER OSTEOMYELITIS, HAND: Primary | ICD-10-CM

## 2021-12-13 LAB
ALBUMIN SERPL BCP-MCNC: 4.2 G/DL (ref 3.5–5.2)
ALP SERPL-CCNC: 62 U/L (ref 55–135)
ALT SERPL W/O P-5'-P-CCNC: 34 U/L (ref 10–44)
ANION GAP SERPL CALC-SCNC: 10 MMOL/L (ref 8–16)
AST SERPL-CCNC: 19 U/L (ref 10–40)
BASOPHILS # BLD AUTO: 0.03 K/UL (ref 0–0.2)
BASOPHILS NFR BLD: 0.7 % (ref 0–1.9)
BILIRUB SERPL-MCNC: 0.5 MG/DL (ref 0.1–1)
BUN SERPL-MCNC: 12 MG/DL (ref 6–20)
CALCIUM SERPL-MCNC: 9.6 MG/DL (ref 8.7–10.5)
CHLORIDE SERPL-SCNC: 106 MMOL/L (ref 95–110)
CO2 SERPL-SCNC: 23 MMOL/L (ref 23–29)
CREAT SERPL-MCNC: 0.8 MG/DL (ref 0.5–1.4)
CRP SERPL-MCNC: 1.1 MG/L (ref 0–8.2)
DIFFERENTIAL METHOD: ABNORMAL
EOSINOPHIL # BLD AUTO: 0.1 K/UL (ref 0–0.5)
EOSINOPHIL NFR BLD: 3 % (ref 0–8)
ERYTHROCYTE [DISTWIDTH] IN BLOOD BY AUTOMATED COUNT: 12.3 % (ref 11.5–14.5)
ERYTHROCYTE [SEDIMENTATION RATE] IN BLOOD BY WESTERGREN METHOD: 3 MM/HR (ref 0–23)
EST. GFR  (AFRICAN AMERICAN): >60 ML/MIN/1.73 M^2
EST. GFR  (NON AFRICAN AMERICAN): >60 ML/MIN/1.73 M^2
FUNGUS SPEC CULT: NORMAL
GLUCOSE SERPL-MCNC: 89 MG/DL (ref 70–110)
HCT VFR BLD AUTO: 39.6 % (ref 40–54)
HGB BLD-MCNC: 13.5 G/DL (ref 14–18)
IMM GRANULOCYTES # BLD AUTO: 0.01 K/UL (ref 0–0.04)
IMM GRANULOCYTES NFR BLD AUTO: 0.2 % (ref 0–0.5)
LYMPHOCYTES # BLD AUTO: 1.5 K/UL (ref 1–4.8)
LYMPHOCYTES NFR BLD: 33.7 % (ref 18–48)
MCH RBC QN AUTO: 29.9 PG (ref 27–31)
MCHC RBC AUTO-ENTMCNC: 34.1 G/DL (ref 32–36)
MCV RBC AUTO: 88 FL (ref 82–98)
MONOCYTES # BLD AUTO: 0.4 K/UL (ref 0.3–1)
MONOCYTES NFR BLD: 8.1 % (ref 4–15)
NEUTROPHILS # BLD AUTO: 2.3 K/UL (ref 1.8–7.7)
NEUTROPHILS NFR BLD: 54.3 % (ref 38–73)
NRBC BLD-RTO: 0 /100 WBC
PLATELET # BLD AUTO: 265 K/UL (ref 150–450)
PMV BLD AUTO: 11 FL (ref 9.2–12.9)
POTASSIUM SERPL-SCNC: 4.5 MMOL/L (ref 3.5–5.1)
PROT SERPL-MCNC: 6.5 G/DL (ref 6–8.4)
RBC # BLD AUTO: 4.51 M/UL (ref 4.6–6.2)
SODIUM SERPL-SCNC: 139 MMOL/L (ref 136–145)
WBC # BLD AUTO: 4.3 K/UL (ref 3.9–12.7)

## 2021-12-13 PROCEDURE — 80053 COMPREHEN METABOLIC PANEL: CPT | Performed by: ORTHOPAEDIC SURGERY

## 2021-12-13 PROCEDURE — 85652 RBC SED RATE AUTOMATED: CPT | Performed by: ORTHOPAEDIC SURGERY

## 2021-12-13 PROCEDURE — 86140 C-REACTIVE PROTEIN: CPT | Performed by: ORTHOPAEDIC SURGERY

## 2021-12-13 PROCEDURE — 85025 COMPLETE CBC W/AUTO DIFF WBC: CPT | Performed by: ORTHOPAEDIC SURGERY

## 2021-12-20 ENCOUNTER — OFFICE VISIT (OUTPATIENT)
Dept: PRIMARY CARE CLINIC | Facility: CLINIC | Age: 46
End: 2021-12-20
Payer: COMMERCIAL

## 2021-12-20 ENCOUNTER — LAB VISIT (OUTPATIENT)
Dept: LAB | Facility: HOSPITAL | Age: 46
End: 2021-12-20
Attending: NURSE PRACTITIONER
Payer: COMMERCIAL

## 2021-12-20 VITALS
BODY MASS INDEX: 25.27 KG/M2 | HEART RATE: 75 BPM | DIASTOLIC BLOOD PRESSURE: 85 MMHG | HEIGHT: 67 IN | SYSTOLIC BLOOD PRESSURE: 120 MMHG | OXYGEN SATURATION: 97 % | WEIGHT: 161 LBS

## 2021-12-20 DIAGNOSIS — E04.1 NONTOXIC UNINODULAR GOITER: Primary | ICD-10-CM

## 2021-12-20 DIAGNOSIS — S61.213D LACERATION OF LEFT MIDDLE FINGER WITHOUT FOREIGN BODY WITHOUT DAMAGE TO NAIL, SUBSEQUENT ENCOUNTER: ICD-10-CM

## 2021-12-20 DIAGNOSIS — L08.9 FINGER INFECTION: ICD-10-CM

## 2021-12-20 DIAGNOSIS — M86.9 OSTEOMYELITIS, UNSPECIFIED SITE, UNSPECIFIED TYPE: Primary | ICD-10-CM

## 2021-12-20 LAB
BASOPHILS # BLD AUTO: 0.03 K/UL (ref 0–0.2)
BASOPHILS NFR BLD: 0.7 % (ref 0–1.9)
DIFFERENTIAL METHOD: NORMAL
EOSINOPHIL # BLD AUTO: 0.1 K/UL (ref 0–0.5)
EOSINOPHIL NFR BLD: 2.7 % (ref 0–8)
ERYTHROCYTE [DISTWIDTH] IN BLOOD BY AUTOMATED COUNT: 12.4 % (ref 11.5–14.5)
ERYTHROCYTE [SEDIMENTATION RATE] IN BLOOD BY WESTERGREN METHOD: <2 MM/HR (ref 0–23)
HCT VFR BLD AUTO: 42.7 % (ref 40–54)
HGB BLD-MCNC: 14.3 G/DL (ref 14–18)
IMM GRANULOCYTES # BLD AUTO: 0 K/UL (ref 0–0.04)
IMM GRANULOCYTES NFR BLD AUTO: 0 % (ref 0–0.5)
LYMPHOCYTES # BLD AUTO: 1.6 K/UL (ref 1–4.8)
LYMPHOCYTES NFR BLD: 36.1 % (ref 18–48)
MCH RBC QN AUTO: 29.9 PG (ref 27–31)
MCHC RBC AUTO-ENTMCNC: 33.5 G/DL (ref 32–36)
MCV RBC AUTO: 89 FL (ref 82–98)
MONOCYTES # BLD AUTO: 0.3 K/UL (ref 0.3–1)
MONOCYTES NFR BLD: 7.1 % (ref 4–15)
NEUTROPHILS # BLD AUTO: 2.4 K/UL (ref 1.8–7.7)
NEUTROPHILS NFR BLD: 53.4 % (ref 38–73)
NRBC BLD-RTO: 0 /100 WBC
PLATELET # BLD AUTO: 273 K/UL (ref 150–450)
PMV BLD AUTO: 10.6 FL (ref 9.2–12.9)
RBC # BLD AUTO: 4.78 M/UL (ref 4.6–6.2)
THYROGLOB AB SERPL IA-ACNC: <4 IU/ML (ref 0–3.9)
WBC # BLD AUTO: 4.51 K/UL (ref 3.9–12.7)

## 2021-12-20 PROCEDURE — 80061 LIPID PANEL: CPT | Performed by: NURSE PRACTITIONER

## 2021-12-20 PROCEDURE — 99213 OFFICE O/P EST LOW 20 MIN: CPT | Mod: S$GLB,,, | Performed by: INTERNAL MEDICINE

## 2021-12-20 PROCEDURE — 84481 FREE ASSAY (FT-3): CPT | Performed by: NURSE PRACTITIONER

## 2021-12-20 PROCEDURE — 80053 COMPREHEN METABOLIC PANEL: CPT | Performed by: NURSE PRACTITIONER

## 2021-12-20 PROCEDURE — 84439 ASSAY OF FREE THYROXINE: CPT | Performed by: NURSE PRACTITIONER

## 2021-12-20 PROCEDURE — 85025 COMPLETE CBC W/AUTO DIFF WBC: CPT | Performed by: NURSE PRACTITIONER

## 2021-12-20 PROCEDURE — 99213 PR OFFICE/OUTPT VISIT, EST, LEVL III, 20-29 MIN: ICD-10-PCS | Mod: S$GLB,,, | Performed by: INTERNAL MEDICINE

## 2021-12-20 PROCEDURE — 86800 THYROGLOBULIN ANTIBODY: CPT | Performed by: NURSE PRACTITIONER

## 2021-12-20 PROCEDURE — 85652 RBC SED RATE AUTOMATED: CPT | Performed by: NURSE PRACTITIONER

## 2021-12-20 PROCEDURE — 86140 C-REACTIVE PROTEIN: CPT | Performed by: NURSE PRACTITIONER

## 2021-12-20 PROCEDURE — 99999 PR PBB SHADOW E&M-EST. PATIENT-LVL III: ICD-10-PCS | Mod: PBBFAC,,, | Performed by: INTERNAL MEDICINE

## 2021-12-20 PROCEDURE — 99999 PR PBB SHADOW E&M-EST. PATIENT-LVL III: CPT | Mod: PBBFAC,,, | Performed by: INTERNAL MEDICINE

## 2021-12-20 PROCEDURE — 84443 ASSAY THYROID STIM HORMONE: CPT | Performed by: NURSE PRACTITIONER

## 2021-12-21 LAB
ALBUMIN SERPL BCP-MCNC: 4.3 G/DL (ref 3.5–5.2)
ALP SERPL-CCNC: 60 U/L (ref 55–135)
ALT SERPL W/O P-5'-P-CCNC: 29 U/L (ref 10–44)
ANION GAP SERPL CALC-SCNC: 9 MMOL/L (ref 8–16)
AST SERPL-CCNC: 21 U/L (ref 10–40)
BILIRUB SERPL-MCNC: 0.6 MG/DL (ref 0.1–1)
BUN SERPL-MCNC: 13 MG/DL (ref 6–20)
CALCIUM SERPL-MCNC: 9.8 MG/DL (ref 8.7–10.5)
CHLORIDE SERPL-SCNC: 105 MMOL/L (ref 95–110)
CHOLEST SERPL-MCNC: 211 MG/DL (ref 120–199)
CHOLEST/HDLC SERPL: 4.8 {RATIO} (ref 2–5)
CO2 SERPL-SCNC: 23 MMOL/L (ref 23–29)
CREAT SERPL-MCNC: 0.8 MG/DL (ref 0.5–1.4)
CRP SERPL-MCNC: 0.5 MG/L (ref 0–8.2)
EST. GFR  (AFRICAN AMERICAN): >60 ML/MIN/1.73 M^2
EST. GFR  (NON AFRICAN AMERICAN): >60 ML/MIN/1.73 M^2
GLUCOSE SERPL-MCNC: 81 MG/DL (ref 70–110)
HDLC SERPL-MCNC: 44 MG/DL (ref 40–75)
HDLC SERPL: 20.9 % (ref 20–50)
LDLC SERPL CALC-MCNC: 138.2 MG/DL (ref 63–159)
NONHDLC SERPL-MCNC: 167 MG/DL
POTASSIUM SERPL-SCNC: 4.3 MMOL/L (ref 3.5–5.1)
PROT SERPL-MCNC: 7 G/DL (ref 6–8.4)
SODIUM SERPL-SCNC: 137 MMOL/L (ref 136–145)
T3FREE SERPL-MCNC: 3 PG/ML (ref 2.3–4.2)
T4 FREE SERPL-MCNC: 0.93 NG/DL (ref 0.71–1.51)
TRIGL SERPL-MCNC: 144 MG/DL (ref 30–150)
TSH SERPL DL<=0.005 MIU/L-ACNC: 3.71 UIU/ML (ref 0.4–4)

## 2021-12-22 ENCOUNTER — DOCUMENT SCAN (OUTPATIENT)
Dept: HOME HEALTH SERVICES | Facility: HOSPITAL | Age: 46
End: 2021-12-22
Payer: COMMERCIAL

## 2021-12-27 ENCOUNTER — LAB VISIT (OUTPATIENT)
Dept: LAB | Facility: HOSPITAL | Age: 46
End: 2021-12-27
Attending: ORTHOPAEDIC SURGERY
Payer: COMMERCIAL

## 2021-12-27 DIAGNOSIS — M86.8X4 OTHER OSTEOMYELITIS, HAND: Primary | ICD-10-CM

## 2021-12-27 LAB
BASOPHILS # BLD AUTO: 0.05 K/UL (ref 0–0.2)
BASOPHILS NFR BLD: 1.2 % (ref 0–1.9)
DIFFERENTIAL METHOD: NORMAL
EOSINOPHIL # BLD AUTO: 0.1 K/UL (ref 0–0.5)
EOSINOPHIL NFR BLD: 2.4 % (ref 0–8)
ERYTHROCYTE [DISTWIDTH] IN BLOOD BY AUTOMATED COUNT: 12.3 % (ref 11.5–14.5)
ERYTHROCYTE [SEDIMENTATION RATE] IN BLOOD BY WESTERGREN METHOD: <2 MM/HR (ref 0–23)
HCT VFR BLD AUTO: 41.8 % (ref 40–54)
HGB BLD-MCNC: 14.2 G/DL (ref 14–18)
IMM GRANULOCYTES # BLD AUTO: 0.01 K/UL (ref 0–0.04)
IMM GRANULOCYTES NFR BLD AUTO: 0.2 % (ref 0–0.5)
LYMPHOCYTES # BLD AUTO: 1.8 K/UL (ref 1–4.8)
LYMPHOCYTES NFR BLD: 42 % (ref 18–48)
MCH RBC QN AUTO: 30 PG (ref 27–31)
MCHC RBC AUTO-ENTMCNC: 34 G/DL (ref 32–36)
MCV RBC AUTO: 88 FL (ref 82–98)
MONOCYTES # BLD AUTO: 0.3 K/UL (ref 0.3–1)
MONOCYTES NFR BLD: 7 % (ref 4–15)
NEUTROPHILS # BLD AUTO: 2 K/UL (ref 1.8–7.7)
NEUTROPHILS NFR BLD: 47.2 % (ref 38–73)
NRBC BLD-RTO: 0 /100 WBC
PLATELET # BLD AUTO: 292 K/UL (ref 150–450)
PMV BLD AUTO: 10 FL (ref 9.2–12.9)
RBC # BLD AUTO: 4.73 M/UL (ref 4.6–6.2)
WBC # BLD AUTO: 4.17 K/UL (ref 3.9–12.7)

## 2021-12-27 PROCEDURE — 86140 C-REACTIVE PROTEIN: CPT | Performed by: ORTHOPAEDIC SURGERY

## 2021-12-27 PROCEDURE — 85652 RBC SED RATE AUTOMATED: CPT | Performed by: ORTHOPAEDIC SURGERY

## 2021-12-27 PROCEDURE — 85025 COMPLETE CBC W/AUTO DIFF WBC: CPT | Performed by: ORTHOPAEDIC SURGERY

## 2021-12-27 PROCEDURE — 80053 COMPREHEN METABOLIC PANEL: CPT | Performed by: ORTHOPAEDIC SURGERY

## 2021-12-28 LAB
ALBUMIN SERPL BCP-MCNC: 4.4 G/DL (ref 3.5–5.2)
ALP SERPL-CCNC: 62 U/L (ref 55–135)
ALT SERPL W/O P-5'-P-CCNC: 30 U/L (ref 10–44)
ANION GAP SERPL CALC-SCNC: 12 MMOL/L (ref 8–16)
AST SERPL-CCNC: 28 U/L (ref 10–40)
BILIRUB SERPL-MCNC: 0.7 MG/DL (ref 0.1–1)
BUN SERPL-MCNC: 15 MG/DL (ref 6–20)
CALCIUM SERPL-MCNC: 9.6 MG/DL (ref 8.7–10.5)
CHLORIDE SERPL-SCNC: 102 MMOL/L (ref 95–110)
CO2 SERPL-SCNC: 21 MMOL/L (ref 23–29)
CREAT SERPL-MCNC: 0.9 MG/DL (ref 0.5–1.4)
CRP SERPL-MCNC: 0.8 MG/L (ref 0–8.2)
EST. GFR  (AFRICAN AMERICAN): >60 ML/MIN/1.73 M^2
EST. GFR  (NON AFRICAN AMERICAN): >60 ML/MIN/1.73 M^2
GLUCOSE SERPL-MCNC: 71 MG/DL (ref 70–110)
POTASSIUM SERPL-SCNC: 4.1 MMOL/L (ref 3.5–5.1)
PROT SERPL-MCNC: 7 G/DL (ref 6–8.4)
SODIUM SERPL-SCNC: 135 MMOL/L (ref 136–145)

## 2021-12-30 ENCOUNTER — OFFICE VISIT (OUTPATIENT)
Dept: ORTHOPEDICS | Facility: CLINIC | Age: 46
End: 2021-12-30
Payer: COMMERCIAL

## 2021-12-30 ENCOUNTER — HOSPITAL ENCOUNTER (OUTPATIENT)
Dept: RADIOLOGY | Facility: HOSPITAL | Age: 46
Discharge: HOME | End: 2021-12-30
Attending: ORTHOPAEDIC SURGERY
Payer: COMMERCIAL

## 2021-12-30 VITALS — BODY MASS INDEX: 25.27 KG/M2 | WEIGHT: 161 LBS | HEIGHT: 67 IN

## 2021-12-30 DIAGNOSIS — M79.646 PAIN OF FINGER, UNSPECIFIED LATERALITY: Primary | ICD-10-CM

## 2021-12-30 DIAGNOSIS — M79.646 PAIN OF FINGER, UNSPECIFIED LATERALITY: ICD-10-CM

## 2021-12-30 DIAGNOSIS — M86.9 OSTEOMYELITIS, UNSPECIFIED SITE, UNSPECIFIED TYPE: ICD-10-CM

## 2021-12-30 PROCEDURE — 73140 X-RAY EXAM OF FINGER(S): CPT | Mod: TC,PN

## 2021-12-30 PROCEDURE — 3008F PR BODY MASS INDEX (BMI) DOCUMENTED: ICD-10-PCS | Mod: CPTII,S$GLB,, | Performed by: ORTHOPAEDIC SURGERY

## 2021-12-30 PROCEDURE — 3008F BODY MASS INDEX DOCD: CPT | Mod: CPTII,S$GLB,, | Performed by: ORTHOPAEDIC SURGERY

## 2021-12-30 PROCEDURE — 99999 PR PBB SHADOW E&M-EST. PATIENT-LVL III: ICD-10-PCS | Mod: PBBFAC,,, | Performed by: ORTHOPAEDIC SURGERY

## 2021-12-30 PROCEDURE — 73140 X-RAY EXAM OF FINGER(S): CPT | Mod: 26,,, | Performed by: RADIOLOGY

## 2021-12-30 PROCEDURE — 99999 PR PBB SHADOW E&M-EST. PATIENT-LVL III: CPT | Mod: PBBFAC,,, | Performed by: ORTHOPAEDIC SURGERY

## 2021-12-30 PROCEDURE — 1159F MED LIST DOCD IN RCRD: CPT | Mod: CPTII,S$GLB,, | Performed by: ORTHOPAEDIC SURGERY

## 2021-12-30 PROCEDURE — 99024 PR POST-OP FOLLOW-UP VISIT: ICD-10-PCS | Mod: S$GLB,,, | Performed by: ORTHOPAEDIC SURGERY

## 2021-12-30 PROCEDURE — 1159F PR MEDICATION LIST DOCUMENTED IN MEDICAL RECORD: ICD-10-PCS | Mod: CPTII,S$GLB,, | Performed by: ORTHOPAEDIC SURGERY

## 2021-12-30 PROCEDURE — 73140 XR FINGER 2 OR MORE VIEWS: ICD-10-PCS | Mod: 26,,, | Performed by: RADIOLOGY

## 2021-12-30 PROCEDURE — 99024 POSTOP FOLLOW-UP VISIT: CPT | Mod: S$GLB,,, | Performed by: ORTHOPAEDIC SURGERY

## 2021-12-30 RX ORDER — CIPROFLOXACIN 500 MG/1
500 TABLET ORAL 2 TIMES DAILY
Qty: 28 TABLET | Refills: 1 | Status: SHIPPED | OUTPATIENT
Start: 2021-12-30 | End: 2022-01-13

## 2021-12-30 RX ORDER — IBUPROFEN 800 MG/1
800 TABLET ORAL 2 TIMES DAILY WITH MEALS
Qty: 60 TABLET | Refills: 1 | Status: SHIPPED | OUTPATIENT
Start: 2021-12-30 | End: 2022-02-25

## 2022-01-01 LAB
ACID FAST MOD KINY STN SPEC: NORMAL
MYCOBACTERIUM SPEC QL CULT: NORMAL

## 2022-01-04 ENCOUNTER — DOCUMENT SCAN (OUTPATIENT)
Dept: HOME HEALTH SERVICES | Facility: HOSPITAL | Age: 47
End: 2022-01-04
Payer: COMMERCIAL

## 2022-01-06 ENCOUNTER — CLINICAL SUPPORT (OUTPATIENT)
Dept: REHABILITATION | Facility: HOSPITAL | Age: 47
End: 2022-01-06
Attending: ORTHOPAEDIC SURGERY
Payer: COMMERCIAL

## 2022-01-06 DIAGNOSIS — M86.9 OSTEOMYELITIS, UNSPECIFIED SITE, UNSPECIFIED TYPE: ICD-10-CM

## 2022-01-06 DIAGNOSIS — M25.642 DECREASED RANGE OF MOTION OF FINGER OF LEFT HAND: ICD-10-CM

## 2022-01-06 PROCEDURE — 97018 PARAFFIN BATH THERAPY: CPT | Mod: PN

## 2022-01-06 PROCEDURE — 97110 THERAPEUTIC EXERCISES: CPT | Mod: PN

## 2022-01-06 PROCEDURE — 97168 OT RE-EVAL EST PLAN CARE: CPT | Mod: PN

## 2022-01-06 NOTE — PATIENT INSTRUCTIONS
[facility], OCCUPATIONAL THERAPY  HOME EXERCISE PROGRAM     Complete the following stretches holding for 30 seconds per stretch. Complete 3 of each stretch, 3x/day.     PIP Flexion Stretch   Use other hand to bend the middle joint of your finger down as far as possible.     DIP Flexion Stretch   Use other hand to gently bend tip joint of your finger.       MP Flexion Stretch          Use other hand to gently bend the large knuckle of your finger.      Composite Flexion Stretch  Use other hand to bend your finger at all three joints.     Hook Stretch  Use other hand to bend middle and tip joints of your finger.       Copyright © [employer]. All rights reserved.       Therapist: Deni العلي, LIZZETHR/LAM

## 2022-01-06 NOTE — PROGRESS NOTES
Occupational Therapy Daily Treatment Note     Date: 1/6/2022  Name: Armen Mcconnell  [facility] Number: [id number]    Therapy Diagnosis:   Encounter Diagnoses   Name Primary?    Osteomyelitis, unspecified site, unspecified type     Decreased range of motion of finger of left hand      Physician: Pratik Jain Jr., *    Medical Diagnosis: S56.129A,S61.209A (ICD-10-CM) - Flexor tendon laceration of finger with open wound, initial encounter  Physician Orders: OT Eval & Tx; Custom splinting: Dorsal enciso splint  Evaluation Date: 11/2/2021  Insurance Authorization Period Expiration: 12/31/2021  Plan of Care from 11/2/2021 to 03/02/2022   Surgery Date: 10/8/21  and Procedure: 1. REPAIR OF FLEXOR DIGITORUM PROFUNDUS TENDON LACERATION LEFT MIDDLE FINGER.   2. REPAIR OF DIGITAL NERVE LACERATION X 2 LEFT MIDDLE FINGER.     Date of Return to MD: 11/11/21       Visit #: 6 of 50  Time In: 3:30 pm  Time Out: 4:20 pm  Total Billable Time: 50 minutes     Precautions:  Standard, Gentle strengthening and PROM to begin   Other considerations: Primary language is Bermudian. Wife is fluent in English and accompanies him for interpretation.  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    Subjective     Pt report/Response to previous treatments: He had to have another surgery to remove infection then a PICC line for 6 weeks with antibiotics.   Pain: 5/10 At worst  Location: Left LF    Objective      Mental status: alert, interactive, receiving interpretation from wife      Observation:   Pt arrives today wearing the custom orthosis. LF appropriately bandaged. Sanguineous drainage on gauze. OT notes MD plan and patient's decisions for addressing the infection. Skin is intact in all other areas of the hand and wrist. IF laceration were debrided on 11/8/21.       Edema: Circumferential measurements: In centimters     IE-11/2/21 11/5/21 11/8/21 11/10/21 1/6/22    Right/Left Left Left L L   Long (Middle)  "Finger :          P1 7.1/8.7 8.2 (-.5) 8.1 (-.1) 7.8 (-.3) 7.1    PIP 7.4/9.4 8.9 (-.5) 8.5 (-.4) 8.6 (+.1) 7.8   P2     7.1    DIP   defer  6.2   P3     5.6   Index Finger :          P1 7.4/8.2 7.8 (-.4) 7.7 (-.1) 7.6 (-.1) 7.5    PIP 7.3/7.8 7.5 (-.3) 7.5 (=) 7.6 (+.1) 7.6   P2     6.4    DIP        P3        Ring Finger:        P1 6.8/7.4 7.0 (-.4) 6.9 (-.1) 6.8 (-.1) 6.6   PIP 7.0/7.8 7.5 (-.3) 7.2 (-.3) 6.9 (-.3) 6.9     Range of Motion:  Long (Middle) Finger AROM   Left Left Left Left Left   DATE IE-11/2/21 11/5/21 11/8/21 11/10/21 1/6/22   MP e/f -17/70 -7/90 -4/92 0/= 0/96   PIP e/f -23/29 -15/42 -9/55 -7/50 -10/78   DIP e/f -18/20 -20/30 -17/32 -13/34 -21/25   Berg's % 5% 21% 35% 37%    KATZ 61 120 (+59) 149 (+29) 156 (+7)      AROM (measured in centimeters)  Tip to DPC Right/Left L   DATE IE-11/8/2021 1/6/22   Index  4.0 Touching palm    Ring   4.0 1   Small   4.0 2.5   Thumb TBA           Treatment     MUSHTAQ received the following supervised modalities after being cleared for contraindications for 10 minutes:   Paraffin w/ MHP to L hand for 10 min, pre-tx to decrease pain & increase tissue extensibility      MUSHTAQ received the following manual therapy techniques for 10 minutes:   -defer to HEP- Retrograde massage provided and instructed for home care  - R/A girth; check out fit of 1" compression sleeves for IF, RF, SF and Thumb. Size D tubigrip for hand/wrist    MUSHTAQ performed therapeutic exercises for 30 minutes including: Reassessment above   COBAN flexion wrap in comfortable fist position with gentle contract/relax/grasps sequence 2 min   AAROM each digit and composite digit flexion Wrist neutral; 2x15   AROM digit extension palm up to the green t bar 2 min   Lifts with adduction pumps using red sponges, wrist neutral 2 min   TGEs Wrist neutral or within the dorsal block orthosis  1x10       Spreads with Yellow Spidyband Wrist neutral, comfortable digit extension (relaxed in flexion) "   Hook fist to straight fist Rolling dowel 2 cm 2 min    DIPJ blocks with AAROM, 1x10 each IF, LF, RF and SF   Opposition to digit extension Wrist neutral, 1x10 each digit   In-hand storage, translation and rotation Isospheres, 2 min  Pompom chain, 1 length   HEP LLPS PROM all joints of LF 3/30 secs          Discussed ongoing scar management and LLPS at this time with pt and wife showing and demonstrating good understanding.     Home Exercise Program and Home Care Resources/Education:     Education provided:   - Discussion above translated by wife/domestic partner  - HEP as upgraded  - Education provided on all interventions performed during today's session   - Progress towards goals     Written Home Exercises Provided: Patient instructed to cont prior HEP.  Exercises were reviewed and MUSHTAQ was able to demonstrate them prior to the end of the session.  MUSHTAQ demonstrated good  understanding of the HEP provided.   .   See EMR under Patient Instructions for exercises provided 11/5/2021.        Assessment   Pt returns to therapy after 6 week antibiotics and debridement with clearance for return to therapy. Increased ROM and decreased edema measurements noted today with re-evaluation. His wife translated for him today stating he has been doing previous exercises provided and has no pain. He did well today with updated HEP to gentle LLPS and PROM to finger within pain free range. Possible ext splint at DIP and PIP in future due to minor lag development.     Goals:   LTG's (8-10 weeks):  1)   Long (Middle) Finger KATZ to be > 80% of the unaffected hand to increase functional hand use for household I/ADLs.  2)    strength  to be >50% of the unaffected hand for safe handling of work tools and materials.  3)   Pinch strength  to be >60% of the unaffected hand for safe handling of locks, latches and fasteners.  4)   Decrease complaints of pain to  2 out of 10 at worst to increase functional hand use for  ADL/work/leisure activities.  5)  Dexterity to be >80% of the unaffected hand as evidenced by Grooved Peg Board test or patient report of (I) management of fasteners and customary self care containers.  6)   Patient to score at 25% or less on DASH or FOTO to demonstrate improved perception of functional left hand/UE use for return to near to prior level of function for I/ADLs and work.     STG's (4-6 weeks)  1)   Patient to be IND with HEP and orthotic use/wear/care and precautions. ONGOING  2)   Pt to tolerate advancing PREs with self-graded intensity and effective symptom monitoring. Progressing  3)   Long (Middle) Finger  KATZ to be increased by 50 degrees for reactivation of the affected hand performing light bilateral ADLs. MET  4)  Berg's score to be >40% to indicate effective prevention of scar adhesions and optimum glide of tendons. Progressing  5)   Edema to be decreased by .2 to .4 cm to evidence effective pain and edema management. MET  6)   Decrease complaints of pain to  3 out of 10 at worst while performing light stabilization tasks. Progressing    Pt would continue to benefit from skilled OT as per original POC.     MUSHTAQ is progressing as expected towards his goals and there are no updates to goals at this time. Pt prognosis is Good.     Pt will continue to benefit from skilled outpatient occupational therapy to address the deficits listed in the problem list on initial evaluation provide pt/family education and to maximize pt's level of independence in the home and community environment.     Pt's spiritual, cultural and educational needs considered and pt agreeable to plan of care and goals.    Plan     Cont OT to address above goals as per original POC.    Pt to be treated by Occupational Therapy 2-3 times per week for 10-12 weeks during the certification period from 11/2/2021 to 03/02/2022 to achieve the established goals.      Treatment to include: Paraffin, Fluidotherapy, Manual  therapy/joint mobilizations, Modalities for pain management, Therapeutic exercises/activities., Strengthening, Orthotic Fabrication/Fit/Training, Edema Control, Scar Management, Wound Care and Electrical Modalities, as well as any other treatments deemed necessary based on the patient's needs or progress.    Deni العلي, OTR/L

## 2022-01-26 ENCOUNTER — CLINICAL SUPPORT (OUTPATIENT)
Dept: REHABILITATION | Facility: HOSPITAL | Age: 47
End: 2022-01-26
Attending: ORTHOPAEDIC SURGERY
Payer: COMMERCIAL

## 2022-01-26 DIAGNOSIS — M79.89 SWELLING OF LEFT MIDDLE FINGER: ICD-10-CM

## 2022-01-26 DIAGNOSIS — M79.645 PAIN OF LEFT MIDDLE FINGER: ICD-10-CM

## 2022-01-26 DIAGNOSIS — M25.642 DECREASED RANGE OF MOTION OF FINGER OF LEFT HAND: Primary | ICD-10-CM

## 2022-01-26 PROCEDURE — 97110 THERAPEUTIC EXERCISES: CPT | Mod: PN

## 2022-01-26 PROCEDURE — 97140 MANUAL THERAPY 1/> REGIONS: CPT | Mod: PN

## 2022-01-26 PROCEDURE — 97022 WHIRLPOOL THERAPY: CPT | Mod: PN

## 2022-01-26 PROCEDURE — 97530 THERAPEUTIC ACTIVITIES: CPT | Mod: PN

## 2022-01-26 NOTE — PATIENT INSTRUCTIONS
HOME INSTRUCTIONS FOR WEAR OF A FLEXION STRAP      A flexion strap has been fit for your LEFT Long Finger    The purpose of your strap is to:  _X__PROVIDE GENTLE STRETCH (LLPS) FOR YOUR  JOINTS AND SMALL MUSCLES IN THE HAND.    Please wear your strap as follows:       _X_ BEGIN WEARING IT FOR __2__ minutes, _3_ times per day.     DISCONTINUE WEAR IF PAIN OCCURS AT ANY TIME    _X_ INCREASE THE WEAR TIME IN __1-2 minute__ INCREMENTS ( 3 min, then 4 minutes) AS LONG AS THE JOINT/S REMAIN PAIN-FREE.   _X  DO NOT EXCEED 10 MINUTES, 3X/DAY  _X_ BRING THE Strap TO EACH OF YOUR THERAPY SESSIONS SO THAT IT CAN BE ADVANCED AND MODIFIED AS THE RANGE OF MOTION IMPROVES    AFTER YOU WEAR THE FLEXION STRAP AND/OR FLEXION TRACTION GLOVE, PERFORM THE GRIPPING ACTIVITIES WITH YOUR T-PUTTY AS FOLLOWS:              CUSTOM  ORTHOSIS (black SPLINT) INSTRUCTIONS      A CUSTOM SPLINT HAS BEEN FABRICATED FOR YOUR Left Long (middle) finger.    THE PURPOSE OF YOUR SPLINT IS TO:  - To provide end range positioning for lengthening shortened tissues, reducing stiffness or joint contracture      PLEASE WEAR YOUR SPLINT/S AS FOLLOWS:      - Begin wearing 10-15 minutes. If no problems present (pain or skin irritation), increase wear time in 5-10 minute increments.   and The splint should be safe to sleep in if you are able to wear it during the day for up to 2 hours at a time without pain or skin irritation.    PERFORM THE HOME EXERCISES IF INSTRUCTED.     BRING SPLINT/S TO EACH THERAPY SESSION SO THAT YOUR OCCUPATIONAL THERAPIST CAN PROVIDE MODIFICATIONS AS NEEDED TO:  -  Advance the Joint position as needed to continue progressing the range of motion or strength. , Improve the fit and/or comfort., Reduce pressure areas or areas of abrasion., Reduce the size of the splint as your swelling decreases., Repair straps or velcro. and Monitor effectiveness of the splint.    PLEASE WATCH FOR AND BE AWARE OF THE FOLLOWING:  - Signs of pressure or  abrasion that will cause skin irritation or blistering and Broken straps or velcro. Use an ace wrap or tape as needed to secure the splint until it can be repaired.      Keep your orthosis away from any heat sources that may cause it to change shape. Do not leave your orthosis in, near or around the following sources:       Hot water       hand dryers       stove       clothes dryer         radiator     derek car    derek window sill     baggage stored under an airplane            microwave oven    DO NOT alter the SPLINT yourself. It may no longer fit or be effective if it is damaged.    Please contact your Occupational Therapist/Hand Therapist, Radha Pierce at [phone] if you need to request a splint check. If you have specific questions or concerns, you can message her through the [facility] portal at [email or web address].

## 2022-01-26 NOTE — PROGRESS NOTES
"  Occupational Therapy Daily Treatment Note     Date: 1/26/2022  Name: Armen Mcconnell  [facility] Number: [id number]    Therapy Diagnosis:   Encounter Diagnoses   Name Primary?    Pain of left middle finger     Swelling of left middle finger     Decreased range of motion of finger of left hand Yes     Physician: Pratik Jain Jr., *    Medical Diagnosis: M86.9 (ICD-10-CM) - Osteomyelitis, unspecified site, unspecified type  S56.129A,S61.209A (ICD-10-CM) - Flexor tendon laceration of finger with open wound, initial encounter  Physician Orders: OT Eval & Tx; see 12/30/21 Plan below  Evaluation Date: 11/2/2021  Insurance Authorization Period Expiration: 12/31/2021  Plan of Care from 11/2/2021 to 03/02/2022   Surgery Date: 11/13/21 - Operative procedure:  Debridement excisional of bone left middle finger middle phalanx and distal phalanx including biopsy.  10/8/21 - Operative Procedure: 1. REPAIR OF FLEXOR DIGITORUM PROFUNDUS TENDON LACERATION LEFT MIDDLE FINGER.   2. REPAIR OF DIGITAL NERVE LACERATION X 2 LEFT MIDDLE FINGER.    Return to MD: 1/27/22 12/30/22: PLAN:  Even though he is off the IV antibiotics [place] 1 to keep him on Cipro for 2 more weeks 500 mg b.i.d.  I have ordered OT for range of motion gentle strengthening  He was given a squeeze ball today  Motrin twice a day for pain    Visit # / Visits authorized: 2 / 50  Time In: 3:30 pm  Time Out: 4:40 pm  Total Billable Time: 70 minutes    Precautions:  Standard; see order specifics  Other considerations: Primary language is Syriac. Wife is fluent in English and accompanies him for interpretation.  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    Subjective     Pt reports: Pt explains that worst pain is a "6"/10. He has no resting pain. He leaves understanding upgrade HEP with static progressive IPJ extension splint and dynamic IPJ flexion strap. Wife was present throughout with explanations as needed.  Response to " previous treatment: this is his 2nd visit since I&D; he arrives with decreased LF AROM    Pain: 6/10 at worst; 0/10 at rest  Location: Left LF    Objective     Sensation: N/A Distribution   1/26/2022    LEFT   Monofilament Testing    Normal 1.65-2.83 IF, RF, SF & Thumb   Diminished Light Touch 3.22-3.61 LF ulna tip   Diminished Protective 3.84-4.31 LF radial tip   Loss of Protective 4.56-6.65    Untestable >6.65      Edema: Circumferential measurements: In centimters       IE-11/2/21 11/5/21 11/8/21 11/10/21 1/6/22 1/26/22     Right/Left Left Left L L L   Long (Middle) Finger :                P1 7.1/8.7 8.2 (-.5) 8.1 (-.1) 7.8 (-.3) 7.1 7.5    PIP 7.4/9.4 8.9 (-.5) 8.5 (-.4) 8.6 (+.1) 7.8 7.7   P2         7.1 6.8    DIP     defer   6.2 5.9   P3         5.6 nt        Range of Motion:  Long (Middle) Finger AROM    Left Left Left Left Left Left   DATE IE-11/2/21 11/5/21 11/8/21 11/10/21 1/6/22 1/26/22  Post-tx   MP e/f -17/70 -7/90 -4/92 0/= 0/96 0/97   PIP e/f -23/29 -15/42 -9/55 -7/50 -10/78 -11/82   DIP e/f -18/20 -20/30 -17/32 -13/34 -21/25 -20/29   Morena's % 5% 21% 35% 37%      KATZ 61 120 (+59) 149 (+29) 156 (+7) 168 (+12) 177 (+21)        AROM (measured in centimeters)  Tip to DPC Right/Left L   DATE IE-11/8/2021 1/6/22   Index  4.0 Touching palm    Ring   4.0 1   Small   4.0 2.5   Thumb TBA          Treatment     MUSHTAQ received the following supervised modalities after being cleared for contraindications for 8 minutes:   - Fluidotherapy for promoting healing, reducing pain, desensitization and increasing tissue extensibility    MUSHTAQ received the following direct contact modalities after being cleared for contraindications for 0 minutes:  - defer U/S    MUSHTAQ received the following manual therapy techniques for 15 minutes:   - Scar massage  - Grade II-III IPJ mobilizations  - AA/PROM FDS glides  - AA/PROM DIP flexion and extension  - R/A girth; fit and issued size XXL digitsleeve for daytime and  "1" compression sleeve for night time wear for edema mgmt    MUSHTAQ performed therapeutic activities and exercises for 45 minutes including:  TGEs, spreads and lifts Performed during final minutes of Fluidotherapy   IPJ extension stretch  1/26/22 Fabricated Static progressive IPJ splint; wear schedule and precautions discussed & provided in writing  - trial wear of size C LMB, 2 min; in clinic only   IPJ Flexion stretch 1/26/22 Flexion strap fit and issued; wear schedule and precautions discussed & provided in writing   Intrinsic and extrinsic extension strengthening 2 min each:  - lifts with adduction pumps using red sponges   Extrinsic flexor gliding and  strengthening 2 min each:  - 2 cm hook to full fist rolls  - Finger platter at DIP  - Finger platter at PIP  - Yellow digiflex  - Red t-putty 1x10 each:  - full   - hook    Intrinsic strengthening Defer using Red-t-putty for HEP upgraded accordingly   HEP upgraded 1/26/22 with Extension splint and Flexion strap, red t-putty for full and hook        Home Exercise Program and Home Care Resources/Education:     Education provided:   - HEP upgraded   1/26/22 with Extension splint and Flexion strap, red t-putty for full and hook    - Education provided on all interventions performed during today's session   - Progress towards goals     Written Home Exercises Provided: yes.  Exercises were reviewed and MUSHTAQ was able to demonstrate them prior to the end of the session.  MUSHTAQ demonstrated good  understanding of the HEP provided.     See EMR under Patient Instructions for exercises provided 1/26/2022.        Assessment   1/26: Loss of LF KATZ pre-tx. Fair improvements post-tx this session. Tx program and HEP upgraded accordingly.  Goals:   LTG's (8-10 weeks):  1)   Long (Middle) Finger KATZ to be > 80% of the unaffected hand to increase functional hand use for household I/ADLs.  2)    strength  to be >50% of the unaffected hand for safe " handling of work tools and materials.  3)   Pinch strength  to be >60% of the unaffected hand for safe handling of locks, latches and fasteners.  4)   Decrease complaints of pain to  2 out of 10 at worst to increase functional hand use for ADL/work/leisure activities.  5)  Dexterity to be >80% of the unaffected hand as evidenced by Grooved Peg Board test or patient report of (I) management of fasteners and customary self care containers.  6)   Patient to score at 25% or less on DASH or FOTO to demonstrate improved perception of functional left hand/UE use for return to near to prior level of function for I/ADLs and work.     STG's (4-6 weeks)  1)   Patient to be IND with HEP and orthotic use/wear/care and precautions. ONGOING  2)   Pt to tolerate advancing PREs with self-graded intensity and effective symptom monitoring. Progressing  3)   Long (Middle) Finger  KATZ to be increased by 50 degrees for reactivation of the affected hand performing light bilateral ADLs. MET  4)  Berg's score to be >40% to indicate effective prevention of scar adhesions and optimum glide of tendons. Progressing  5)   Edema to be decreased by .2 to .4 cm to evidence effective pain and edema management. MET  6)   Decrease complaints of pain to  3 out of 10 at worst while performing light stabilization tasks. Progressing  Pt would continue to benefit from skilled OT as per original POC.     MUSHTAQ is progressing as expected towards his goals and there are no updates to goals at this time. Pt prognosis is Good.     Pt will continue to benefit from skilled outpatient occupational therapy to address the deficits listed in the problem list on initial evaluation provide pt/family education and to maximize pt's level of independence in the home and community environment.     Pt's spiritual, cultural and educational needs considered and pt agreeable to plan of care and goals.    Plan   1/26: Modify static progressive extension splint and  flexion strap prn. Upgrade HEP with intrinsic strengthening.  Cont OT to address above goals as per original POC.    Pt to be treated by Occupational Therapy 2-3 times per week for 10-12 weeks during the certification period from 11/2/2021 to 03/02/2022 to achieve the established goals.      Treatment to include: Paraffin, Fluidotherapy, Manual therapy/joint mobilizations, Modalities for pain management, Therapeutic exercises/activities., Strengthening, Orthotic Fabrication/Fit/Training, Edema Control, Scar Management, Wound Care and Electrical Modalities, as well as any other treatments deemed necessary based on the patient's needs or progress.     YOSSI Montgomery, CHT  Occupational Therapist, Certified Hand Therapist

## 2022-01-27 ENCOUNTER — OFFICE VISIT (OUTPATIENT)
Dept: ORTHOPEDICS | Facility: CLINIC | Age: 47
End: 2022-01-27
Payer: COMMERCIAL

## 2022-01-27 VITALS — BODY MASS INDEX: 25.22 KG/M2 | WEIGHT: 161 LBS

## 2022-01-27 DIAGNOSIS — M86.9 OSTEOMYELITIS, UNSPECIFIED SITE, UNSPECIFIED TYPE: Primary | ICD-10-CM

## 2022-01-27 PROBLEM — M79.89 SWELLING OF LEFT MIDDLE FINGER: Status: ACTIVE | Noted: 2022-01-26

## 2022-01-27 PROBLEM — M79.645 PAIN OF LEFT MIDDLE FINGER: Status: ACTIVE | Noted: 2022-01-26

## 2022-01-27 PROCEDURE — 99999 PR PBB SHADOW E&M-EST. PATIENT-LVL III: CPT | Mod: PBBFAC,,, | Performed by: ORTHOPAEDIC SURGERY

## 2022-01-27 PROCEDURE — 3008F PR BODY MASS INDEX (BMI) DOCUMENTED: ICD-10-PCS | Mod: CPTII,S$GLB,, | Performed by: ORTHOPAEDIC SURGERY

## 2022-01-27 PROCEDURE — 99024 PR POST-OP FOLLOW-UP VISIT: ICD-10-PCS | Mod: S$GLB,,, | Performed by: ORTHOPAEDIC SURGERY

## 2022-01-27 PROCEDURE — 1159F MED LIST DOCD IN RCRD: CPT | Mod: CPTII,S$GLB,, | Performed by: ORTHOPAEDIC SURGERY

## 2022-01-27 PROCEDURE — 99024 POSTOP FOLLOW-UP VISIT: CPT | Mod: S$GLB,,, | Performed by: ORTHOPAEDIC SURGERY

## 2022-01-27 PROCEDURE — 3008F BODY MASS INDEX DOCD: CPT | Mod: CPTII,S$GLB,, | Performed by: ORTHOPAEDIC SURGERY

## 2022-01-27 PROCEDURE — 1159F PR MEDICATION LIST DOCUMENTED IN MEDICAL RECORD: ICD-10-PCS | Mod: CPTII,S$GLB,, | Performed by: ORTHOPAEDIC SURGERY

## 2022-01-27 PROCEDURE — 99999 PR PBB SHADOW E&M-EST. PATIENT-LVL III: ICD-10-PCS | Mod: PBBFAC,,, | Performed by: ORTHOPAEDIC SURGERY

## 2022-01-27 NOTE — PROGRESS NOTES
Subjective:      Patient ID: Armen Mcconnell is a 47 y.o. male.  Chief Complaint: Follow-up (3 week follow up left middle finger.)      HPI  Armen Mcconnell is a  47 y.o. male presenting today for post op visit.  He is s/p I&D osteomyelitis of left middle finger  He is doing well he has completed his IV antibiotics  He is currently in therapy but therapy has been delayed a bit because of COVID  Pain minimal he does have some swelling.     Review of patient's allergies indicates:  No Known Allergies      Current Outpatient Medications   Medication Sig Dispense Refill    clotrimazole-betamethasone 1-0.05% (LOTRISONE) cream Apply topically 2 (two) times daily. 1 each 1    ibuprofen (ADVIL,MOTRIN) 600 MG tablet Take 1 tablet (600 mg total) by mouth 3 (three) times daily with meals. 60 tablet 1    levothyroxine (SYNTHROID) 88 MCG tablet Take 88 mcg by mouth before breakfast.      ibuprofen (ADVIL,MOTRIN) 800 MG tablet Take 1 tablet (800 mg total) by mouth 2 (two) times daily with meals. (Patient not taking: Reported on 1/27/2022) 60 tablet 1    oxyCODONE-acetaminophen (PERCOCET) 7.5-325 mg per tablet Take 1 tablet by mouth every 4 (four) hours as needed for Pain. (Patient not taking: No sig reported) 28 tablet 0     No current facility-administered medications for this visit.       Past Medical History:   Diagnosis Date    Thyroid disease        Past Surgical History:   Procedure Laterality Date    FLEXOR TENDON REPAIR Left 10/8/2021    Procedure: REPAIR, TENDON, FLEXOR;  Surgeon: Pratik Jain Jr., MD;  Location: [facility] OR;  Service: Orthopedics;  Laterality: Left;  need supramid 4-0 suture ([facility])    INCISION AND DRAINAGE OF HAND Left 11/13/2021    Procedure: INCISION AND DRAINAGE, HAND;  Surgeon: Pratik Jain Jr., MD;  Location: [facility] OR;  Service: Orthopedics;  Laterality: Left;    REPAIR OF NERVE OF FINGER Left 10/8/2021    Procedure: REPAIR, NERVE, FINGER;  Surgeon: Pratik Jain  MD [place];  Location: [facility];  Service: Orthopedics;  Laterality: Left;  need 8-0 nylon and micro instruments       OBJECTIVE:   PHYSICAL EXAM:    Weight: 73 kg (161 lb)  Vitals:    01/27/22 1000   Weight: 73 kg (161 lb)   PainSc: 0-No pain     Ortho/SPM Exam  Examination left middle finger the wounds are all well healed swelling is minimal range of motion improved passively knee does have pretty good active flexion of the PIP but limited D IP flexion    RADIOGRAPHS:  None  Comments: I have personally reviewed the imaging and I agree with the above radiologist's report.    ASSESSMENT/PLAN:     IMPRESSION:  Osteomyelitis left middle finger improved    PLAN:  I would like him to continue occupational therapy as well as a home exercise program I have encouraged him to start using his hand more more including using a squeeze ball  I have prescribed Pennsaid topical anti-inflammatory cream use on the finger which should help with the swelling    FOLLOW UP:  4-6 weeks    Disclaimer: This note has been generated using voice-recognition software. There may be typographical errors that have been missed during proof-reading.

## 2022-01-28 NOTE — PROGRESS NOTES
Occupational Therapy Daily Treatment Note     Date: 1/31/2022  Name: Armen Mcconnell  [facility] Number: [id number]    Therapy Diagnosis:   Encounter Diagnoses   Name Primary?    Pain of left middle finger     Swelling of left middle finger     Decreased range of motion of finger of left hand      Physician: Pratik Jain Jr., *    Medical Diagnosis: M86.9 (ICD-10-CM) - Osteomyelitis, unspecified site, unspecified type  S56.129A,S61.209A (ICD-10-CM) - Flexor tendon laceration of finger with open wound, initial encounter  Physician Orders: OT Eval & Tx; see 12/30/21 Plan below  Evaluation Date: 11/2/2021  Insurance Authorization Period Expiration: 12/31/2021  Plan of Care from 11/2/2021 to 03/02/2022   Surgery Date: 11/13/21 - Operative procedure:  Debridement excisional of bone left middle finger middle phalanx and distal phalanx including biopsy.  10/8/21 - Operative Procedure: 1. REPAIR OF FLEXOR DIGITORUM PROFUNDUS TENDON LACERATION LEFT MIDDLE FINGER.   2. REPAIR OF DIGITAL NERVE LACERATION X 2 LEFT MIDDLE FINGER.    Return to MD: 1/27/22 12/30/22: PLAN:  Even though he is off the IV antibiotics [place] 1 to keep him on Cipro for 2 more weeks 500 mg b.i.d.  I have ordered OT for range of motion gentle strengthening  He was given a squeeze ball today  Motrin twice a day for pain    Return to MD: 3/10/22  1/27/22: Ortho/SPM Exam  Examination left middle finger the wounds are all well healed swelling is minimal range of motion improved passively knee does have pretty good active flexion of the PIP but limited D IP flexion  PLAN: I would like him to continue occupational therapy as well as a home exercise program I have encouraged him to start using his hand more more including using a squeeze ball  I have prescribed Pennsaid topical anti-inflammatory cream use on the finger which should help with the swelling    Visit # / Visits authorized: 3 / 50  Time In: 3:03 pm  Time Out: 4:15 pm  Total Billable Time: 55  "minutes    Precautions:  Standard; see order specifics  Other considerations: Primary language is Czech. Wife is fluent in English and accompanies him for interpretation.  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    Subjective     Pt reports: 1/31: Pt arrives with 6/10 pain in his left hand today. His wife suspects that increased time wearing the flexion strap or prolonged exercise with the t-putty has increased his pain. She explains "its his favorite thing and he worked with it while he watched the soccer game." Pt reports t-putty exercises every day when he has free time (~ 45 minutes). This was discouraged and original HEP instructions were reviewed for only every other day.  He reports that is currently wearing the flexion strap 3x a day for approximately 7 minutes at a time.   His wife explains that he has been able to wear the static IP extension spint two nights without pain; however, last night after about 3 hours, he had to take it off due to pain.   OT contacted Dr. Jain about increased joint swelling present today. He adviced icing the joints and taking Advil. This was communicated to his wife over the phone. She has not been able to fill the prescription for the topical anti-inflammatory yet.     Response to previous treatment: Pt did not follow HEP instructions for strengthening every other day and through pain-free ranges. Increased joint pain and edema resulting.    Pain: 6/10 pain today when moving fingers 6/10 at worst; 0/10 at rest  Location: Left LF    Objective     Edema: Circumferential measurements: In centimters       IE-11/2/21 11/5/21 11/8/21 11/10/21 1/6/22 1/26/22 1/31/22     Right/Left Left Left L L L L   Long (Middle) Finger :                 P1 7.1/8.7 8.2 (-.5) 8.1 (-.1) 7.8 (-.3) 7.1 7.5 (+0.4) 7.3 (-0.2)    PIP 7.4/9.4 8.9 (-.5) 8.5 (-.4) 8.6 (+.1) 7.8 7.7 (-0.1) 7.9 (+0.2)   P2         7.1 6.8 (-0.3) 6.5 (-0.3)    DIP     defer   6.2 5.9 " "(-0.3) 6.1 (+0.2)   P3         5.6 nt nt        Range of Motion:  Long (Middle) Finger AROM    Left Left Left Left Left Left Left   DATE IE-11/2/21 11/5/21 11/8/21 11/10/21 1/6/22 1/26/22  Post-tx 1/31/22   MP e/f -17/70 -7/90 -4/92 0/= 0/96 0/97 nt   PIP e/f -23/29 -15/42 -9/55 -7/50 -10/78 -11/82 -18/82  (-15 post LMB x 2')   DIP e/f -18/20 -20/30 -17/32 -13/34 -21/25 -20/29 -15/30   Morena's % 5% 21% 35% 37%       KATZ 61 120 (+59) 149 (+29) 156 (+7) 168 (+12) 177 (+21)         AROM (measured in centimeters)  Tip to DPC Right/Left L   DATE IE-11/8/2021 1/6/22   Index  4.0 Touching palm    Ring   4.0 1   Small   4.0 2.5   Thumb TBA          Sensation: N/A Distribution    1/26/2022     LEFT   Monofilament Testing     Normal 1.65-2.83 IF, RF, SF & Thumb   Diminished Light Touch 3.22-3.61 LF ulna tip   Diminished Protective 3.84-4.31 LF radial tip   Loss of Protective 4.56-6.65     Untestable >6.65            Treatment     MUSHTAQ received the following supervised modalities after being cleared for contraindications for 10 minutes:   - Fluidotherapy for promoting healing, reducing pain, desensitization and increasing tissue extensibility    MUSHTAQ received the following direct contact modalities after being cleared for contraindications for 0 minutes:  - defer U/S    MUSHTAQ received the following manual therapy techniques for 8 minutes:   - defer-Scar massage  - Grade I-II IPJ mobilizations  - AA/PROM FDS glides  - AA/PROM DIP flexion and extension  - R/A girth; checked wear schedule of size XXL digitsleeve for daytime and 1" compression sleeve for night time wear for edema mgmt    MUSHTAQ performed therapeutic activities and exercises for 29 minutes including:  TGEs, spreads and lifts Performed during final minutes of Fluidotherapy   IPJ extension stretch  1/26/22 Checked fit of static progressive IPJ splint; wear schedule and precautions discussed & provided in writing  - trial wear of size C LMB, 2 " min; pt reports no pain after wear*; Added to HEP 1/31/22    IPJ Flexion stretch 1/26/22 Flexion strap fit checked; wear schedule and precautions discussed & provided in writing   Intrinsic and extrinsic extension strengthening 2 min each:  - lifts with adduction pumps using red sponges   Extrinsic flexor gliding and  strengthening 2 min each:  - 2 cm hook to full fist rolls  - Finger platter at DIP  - Finger platter at PIP  - Yellow digiflex  - defer to HEP after pain resolves-Red t-putty 1x10 each:  - full   - hook    Intrinsic strengthening 1 x 5 each: using red t-putty  - Extension Lifts during Log roll  -#2 & #3 DAB; Finger add/abd  -Lateral pinch  -3 pt pinch  -Isometric lumbrical +; finger extension    HEP upgraded 1/26/22 with Extension splint and Flexion strap, red t-putty for full and hook   1/31/22 with intrinsic strenghening using red t-putty and size C LMB    LF AROM  R/A 1/31/22   * pt gains 3 degrees of PIP extension after LMB trial wear.     Armen and his wife received Self Care instructions and Orthosis Management for 8 minutes and participated in an ongoing and concurrent discussion of re/evaluation findings and specific home care, including:   - Check out and review safe application of Static Progressive splint; compression sleeve to be worn underneath; light strap tension only; remove if pain presents  - Fit and issued size C LMB; Wear/care & precautions discussed in detail and provided in writing  - Check out and reviewed safe application of Flexion Strap; 2 minutes only, building wearing tolerance in 1 minute increments if Pain-free and not to exceed 10 minutes 3x/day  - Monitor pain closely, DECREASE wear time of flexion strap and DISCONTINUE prolonged t-putty gripping at home   - Surgeon instructions for use of Advil and ice rec'd via private chat; OT spoke with wife then over the phone  - discussion of factors that may influence increased joint swelling; Tx program and HEP  adjusted/reviewed accordingly  - education on t-putty exercises to be completed only 1 x every other day, 5 repetitions of each exercise.  - defer- the anatomy and pathophysiology of the tissue involvement.  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   - Education provided on all interventions performed during today's session   - Progress towards goals    Education provided in previous sessions:  -HEP upgraded on 1/26/22 with Extension splint and Flexion strap, red t-putty for full and hook     Written Home Exercises Provided: yes.  Exercises were reviewed and MUSHTAQ was able to demonstrate them prior to the end of the session.  MUSHTAQ demonstrated good  understanding of the HEP provided.     See EMR under Patient Instructions for exercises provided 1/26/2022, 1/31/22        Assessment   1/31: Pt presents with increased pain, stiffness, and edema in the left LF PIP and DIP today. Suspects increased pain is due to prolonged time spent using the t-putty, however work tasks are considered heavy at times for Left hand /stabilization activities. Pt and wife understand how over-strengthening can inflame the joints and slow the healing process. Wife understands use of OTS NSAIDs and ice as directed by surgeon today.    Goals:   LTG's  (8-10 weeks):  1)   Long (Middle) Finger KATZ to be > 80% of the unaffected hand to increase functional hand use for household I/ADLs.  2)    strength  to be >50% of the unaffected hand for safe handling of work tools and materials.  3)   Pinch strength  to be >60% of the unaffected hand for safe handling of locks, latches and fasteners.  4)   Decrease complaints of pain to  2 out of 10 at worst to increase functional hand use for ADL/work/leisure activities.  5)  Dexterity to be >80% of the unaffected hand as evidenced by Grooved Peg Board test or patient report of (I) management of fasteners and customary self care containers.  6)   Patient to score at 25% or less on DASH or FOTO to demonstrate improved perception of functional left hand/UE use for return to near to prior level of function for I/ADLs and work.     STG's (4-6 weeks)  1)   Patient to be IND with HEP and orthotic use/wear/care and precautions. ONGOING  2)   Pt to tolerate advancing PREs with self-graded intensity and effective symptom monitoring. Progressing  3)   Long (Middle) Finger  KATZ to be increased by 50 degrees for reactivation of the affected hand performing light bilateral ADLs. MET  4)  Berg's score to be >40% to indicate effective prevention of scar adhesions and optimum glide of tendons. Progressing  5)   Edema to be decreased by .2 to .4 cm to evidence effective pain and edema management. MET  6)   Decrease complaints of pain to  3 out of 10 at worst while performing light stabilization tasks. Progressing  Pt would continue to benefit from skilled OT as per original POC.     MUSHTAQ is progressing as expected towards his goals and there are no updates to goals at this time. Pt prognosis is Good.     Pt will continue to benefit from skilled outpatient occupational therapy to address the deficits listed in the problem list on initial evaluation provide pt/family education and to maximize pt's level of independence in the home  "and community environment.     Pt's spiritual, cultural and educational needs considered and pt agreeable to plan of care and goals.    Plan   1/31: R/A girth. R/A LF AROM to determine benefits of upgraded HEP and LMB wear. Modify static progressive extension splint and flexion strap prn. Provide  to ensure patient understanding of all instructions and precautions.    Pt to be treated by Occupational Therapy 2-3 times per week for 10-12 weeks during the certification period from 11/2/2021 to 03/02/2022 to achieve the established goals.      Treatment to include: Paraffin, Fluidotherapy, Manual therapy/joint mobilizations, Modalities for pain management, Therapeutic exercises/activities., Strengthening, Orthotic Fabrication/Fit/Training, Edema Control, Scar Management, Wound Care and Electrical Modalities, as well as any other treatments deemed necessary based on the patient's needs or progress.     TAYLOR Erickson  Student Occupational Therapist    "I, YOSSI Montgomery CHT,  certify that I was present in the room directing the student in service delivery and guiding them using my skilled judgment. As the co-signing therapist, I have reviewed the student's documentation and am responsible for the treatment, assessment and plan."    YOSSI Montgomery CHT  Occupational Therapist, Certified Hand Therapist      "

## 2022-01-31 ENCOUNTER — CLINICAL SUPPORT (OUTPATIENT)
Dept: REHABILITATION | Facility: HOSPITAL | Age: 47
End: 2022-01-31
Attending: ORTHOPAEDIC SURGERY
Payer: COMMERCIAL

## 2022-01-31 DIAGNOSIS — M79.645 PAIN OF LEFT MIDDLE FINGER: ICD-10-CM

## 2022-01-31 DIAGNOSIS — M79.89 SWELLING OF LEFT MIDDLE FINGER: ICD-10-CM

## 2022-01-31 DIAGNOSIS — M25.642 DECREASED RANGE OF MOTION OF FINGER OF LEFT HAND: ICD-10-CM

## 2022-01-31 PROCEDURE — 97022 WHIRLPOOL THERAPY: CPT | Mod: PN

## 2022-01-31 PROCEDURE — 97110 THERAPEUTIC EXERCISES: CPT | Mod: PN

## 2022-01-31 PROCEDURE — 97530 THERAPEUTIC ACTIVITIES: CPT | Mod: PN

## 2022-01-31 PROCEDURE — 97535 SELF CARE MNGMENT TRAINING: CPT | Mod: PN

## 2022-02-02 NOTE — PROGRESS NOTES
Occupational Therapy Daily Treatment Note     Date: 2/3/2022  Name: Armen Mcconnell  Clinic Number: [id number]    Therapy Diagnosis:   No diagnosis found.  Physician: Pratik Jain Jr., *    Medical Diagnosis: M86.9 (ICD-10-CM) - Osteomyelitis, unspecified site, unspecified type  S56.129A,S61.209A (ICD-10-CM) - Flexor tendon laceration of finger with open wound, initial encounter  Physician Orders: OT Eval & Tx; see 12/30/21 Plan below  Evaluation Date: 11/2/2021  Insurance Authorization Period Expiration: 12/31/2021  Plan of Care from 11/2/2021 to 03/02/2022   Surgery Date: 11/13/21 - Operative procedure:  Debridement excisional of bone left middle finger middle phalanx and distal phalanx including biopsy.  10/8/21 - Operative Procedure: 1. REPAIR OF FLEXOR DIGITORUM PROFUNDUS TENDON LACERATION LEFT MIDDLE FINGER.   2. REPAIR OF DIGITAL NERVE LACERATION X 2 LEFT MIDDLE FINGER.    Return to MD: 1/27/22 12/30/22: PLAN:  Even though he is off the IV antibiotics [place] 1 to keep him on Cipro for 2 more weeks 500 mg b.i.d.  I have ordered OT for range of motion gentle strengthening  He was given a squeeze ball today  Motrin twice a day for pain    Return to MD: 3/10/22  1/27/22: Ortho/SPM Exam  Examination left middle finger the wounds are all well healed swelling is minimal range of motion improved passively knee does have pretty good active flexion of the PIP but limited D IP flexion  PLAN: I would like him to continue occupational therapy as well as a home exercise program I have encouraged him to start using his hand more more including using a squeeze ball  I have prescribed Pennsaid topical anti-inflammatory cream use on the finger which should help with the swelling    Visit # / Visits authorized: 4 / 50  Time In: 3:45  pm  Time Out: 4:50 pm  Total Billable Time: 54 minutes    Precautions:  Standard; see order specifics  Other considerations: Primary language is [place]. Wife (Significant Other) is fluent  in English and accompanies him for interpretation.   Family refused interpretation services offered on 2/3/22 explaining that they would not return to our clinic if we did not let family interpret.  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    Subjective     Pt reports: 2/3: Pt's wife (SO) confirms that the wear schedules for the LMB and the flexion strap were followed as modified last session; 3-4 minutes each 3x a day. The patient confirms wearing the static progressive IPJ extension orthosis while sleeping with no further sleep interruptions or pain. Pt arrives without the HEP media or splints. The Advil and ice had been used as directed by Dr. Jain since last seen on 2/2/22. Pt and wife leave understanding rationale, application and wear schedule instructed for the new DIP static progressive (clam shell) splint.    Response to previous treatment: Pt did follow HEP instructions for strengthening every other day and through pain-free ranges. Now with decreased joint pain and edema resulting.    Pain: 4/10 pain today when moving fingers 6/10 at worst; 0/10 at rest  Location: Left LF    Objective     Edema: Circumferential measurements: In centimters     IE-11/2/21 11/5/21 11/8/21 11/10/21 1/6/22 1/26/22 1/31/22 2/2/22     Right/Left Left Left L L L L L   Long (Middle) Finger :                  P1 7.1/8.7 8.2 (-.5) 8.1 (-.1) 7.8 (-.3) 7.1 7.5 (+0.4) 7.3 (-0.2) 6.9 (-0.4)    PIP 7.4/9.4 8.9 (-.5) 8.5 (-.4) 8.6 (+.1) 7.8 7.7 (-0.1) 7.9 (+0.2) 7.6 (-0.3)   P2         7.1 6.8 (-0.3) 6.5 (-0.3) 6.7 (+0.2)    DIP     defer   6.2 5.9 (-0.3) 6.1 (+0.2) 5.8 (-0.3)   P3         5.6 nt nt       Range of Motion:  Long (Middle) Finger AROM    Left Left Left Left Left Left Left Left   DATE IE-11/2/21 11/5/21 11/8/21 11/10/21 1/6/22 1/26/22  Post-tx 1/31/22  Pre-tx 2/3/22  Pre-tx    MP e/f -17/70 -7/90 -4/92 0/= 0/96 0/97 nt nt   PIP e/f -23/29 -15/42 -9/55 -7/50 -10/78 -11/82 * -18/82 -13/79  "  DIP e/f -18/20 -20/30 -17/32 -13/34 -21/25 -20/29 -15/30 -20/33   Berg's % 5% 21% 35% 37%        KATZ 61 120 (+59) 149 (+29) 156 (+7) 168 (+12) 177 (+21)      * (-15 post LMB x 2')    Long (Middle) Finger PROM    Left   DATE 2/3/22   MP e/f defer   PIP e/f -7/84   DIP e/f -8/35   TPM defer     AROM (measured in centimeters)  Tip to DPC Right/Left L   DATE IE-11/8/2021 1/6/22   Index  4.0 Touching palm    Ring   4.0 1   Small   4.0 2.5   Thumb TBA        Sensation: N/A Distribution    1/26/2022     LEFT   Monofilament Testing     Normal 1.65-2.83 IF, RF, SF & Thumb   Diminished Light Touch 3.22-3.61 LF ulna tip   Diminished Protective 3.84-4.31 LF radial tip   Loss of Protective 4.56-6.65     Untestable >6.65        Strength: (measured in psi.)    Right/Left    2/3/2022   Brigido Rung /54   Ariza Pinch 20/17   3-pt Pinch 24/12       Treatment     MUSHTAQ received the following supervised modalities after being cleared for contraindications for 8 minutes:   -defer- Fluidotherapy for promoting healing, reducing pain, desensitization and increasing tissue extensibility  -Paraffin wax heat pre-tx for promoting healing, decreasing pain and increasing tissue extensibility    MUSHTAQ received the following direct contact modalities after being cleared for contraindications for 0 minutes:  - defer U/S    MUSHTAQ received the following manual therapy techniques for 20 minutes:   - Scar massage  - Grade I-II IPJ mobilizations  - AA/PROM FDS glides  - AA/PROM DIP flexion  - AA/PROM ORL stretch  - R/A girth; checked wear schedule of size XXL digitsleeve for daytime and 1" compression sleeve for night time wear for edema mgmt  - AA/PROM DIP Extension f/b application of a static clam-shell splint targeted to be progressive DIP extension splint; wear schedule and precautions discussed in detail; instructions discussed and interpreted by pt's wife.    MUSHTAQ performed therapeutic activities and exercises for 20 " "minutes including:  TGEs, spreads and lifts Defer-Performed during final minutes of Fluidotherapy   IPJ extension stretch  2/3/22 Trial size B LMB for PIP, in-clinic only  2/3/22 Trial DIP clam shell, issued for HEP   IPJ Flexion stretch 1/26/22 Flexion strap fit checked; wear schedule and precautions discussed & provided in writing   Intrinsic and extrinsic extension strengthening 2 min each:  - lifts with adduction pumps using red sponges   Extrinsic flexor gliding and  strengthening 2 min each:  - 2 cm hook to full fist rolls  - Finger platter at DIP  - Finger platter at PIP  - defer-Yellow digiflex  - defer to HEP after pain resolves-Red t-putty 1x10 each:  - full   - hook    Intrinsic strengthening 2 min each with yellow spidyband  - isometric Lumbrical, positioned at P3  - spreads   LF ROM R/A 2/3/22 - R/A AROM   2/3/22 - Assessed baseline PROM   Strengthening for hand/ wrist, elbow and shoulder Yellow t-band positioned at hand level,1 min each:  - Wrist/digit extension with scapular retraction and shoulder external rotation  - Wrist ulnar deviation w/scap retract & shld ER  - Wrist radial deviation w/elbow & shld flexion  - wrist flexion with elbow & shld extension   - HEP upgraded on 2/3/22 and return demonstrated as follows:      -Yellow spidyband, 2 min each for:  -isometric lumbrical + placed at P3 & P2, each  -intrinsic & extrinsic LF extension strengthening   -Red t-putty strengthening for #2 & #3 DAB  -wrist/digit extrinsic isometric strengthening using Yellow t-band  -3 cm" dowel for composite flexion/ encouraging FDP glide and end range excursion          Armen and his wife received concurrent Self Care instructions and Orthosis Management for 10 minutes and participated in an ongoing and concurrent discussion of re/evaluation findings and specific home care, including:   IPJ extension; finger-based static progressive splint (finger gutter) Defer-Check out; pt left splint at " "home  2/3/22 Reviewed - Wear schedule: while sleeping only at this time; light strap tension only; 1" compression sleeve to be worn beneath the splint   PIP extension dynamic (size C LMB) Defer-Check out; pt left splint at home yet reports "loose" fitting now  2/3/22 - Trial fit of Size B, not recommended for this week  2/3/22 Reviewed - Wear schedule of size C: tolerance to be built in 1-2 min increments; not to exceed 10 min 3x/day   DIP Extension static progressive (Clam shell) 2/3/22 - Fabricated for reducing developing DIP flexion contracture. Wear schedule: tolerance to be built in 1-2 min increments; not to exceed 10 min 3x/day; best worn just prior to intrinsic strengthening   IPJ flexion dynamic (Elastic Flexion Strap) Check out -deferred-pt left it at home yet reports "loose" fitting now  2/3/22 Reviewed - Wear schedule: tolerance to be built in 1-2 min increments; not to exceed 10 min 3x/day       Home Exercise Program and Home Care Resources/Education:     Education provided:   - See Discussion & HEP as upgraded  - Education provided on all interventions performed during today's session   - Progress towards goals    Education provided in previous sessions:  - Monitor pain closely, DECREASE wear time of flexion strap and DISCONTINUE prolonged t-putty gripping at home   - discussion in activity modifications currently being used to handle heavy bilateral tasks at work; pt confirms using the heal of his hand to push rather than his LF flexors to  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   - Surgeon instructions for use of Advil and ice rec'd via private chat; OT spoke with wife then over the phone  - Check out and reviewed safe application of Flexion Strap; 2 minutes only, building wearing tolerance in 1 minute increments if Pain-free and not to exceed 10 minutes 3x/day  - discussion of factors that may influence increased joint swelling; Tx program and HEP adjusted/reviewed accordingly    Written Home Exercises Provided: yes.  Exercises were reviewed and MUSHTAQ was able to demonstrate them prior to the end of the session.  MUSHTAQ demonstrated good  understanding of the HEP provided.     See EMR under Patient Instructions for exercises provided 1/26/2022, 1/31/22, 2/3/22       Assessment   2/3: Less edema and pain in the LF IPJs today indicating good follow through with MD directives, HEP & splint wear scheduled as adjusted last session. Right  strength is as expected for post-op recovery. LF KATZ as expected for development of new scar, adhesions from the mature scar and this recent episode of joint inflammation due to aggressive use of t-putty. Tx program and HEP upgraded accordingly. Wife and patient appeared satisfied after certified translation was discontinued and apologies made for misunderstanding their preferences for family-assisted translation.    Goals:   LTG's (8-10 weeks):  1)   Long (Middle) Finger KATZ to be > 80% of the unaffected hand to increase functional hand use for household I/ADLs.  2)    strength  to be >50% of the unaffected hand for safe handling of work tools and materials.  3)   Pinch strength  to be >60% of the unaffected hand for safe handling of locks, latches and fasteners.  4)   Decrease complaints of pain to  2 out of 10 at worst to increase functional hand use for ADL/work/leisure activities.  5)  Dexterity to be >80% of the unaffected hand as evidenced  by Grooved Peg Board test or patient report of (I) management of fasteners and customary self care containers.  6)   Patient to score at 25% or less on DASH or FOTO to demonstrate improved perception of functional left hand/UE use for return to near to prior level of function for I/ADLs and work.     STG's (4-6 weeks)  1)   Patient to be IND with HEP and orthotic use/wear/care and precautions. ONGOING  2)   Pt to tolerate advancing PREs with self-graded intensity and effective symptom monitoring. Progressing  3)   Long (Middle) Finger  KATZ to be increased by 50 degrees for reactivation of the affected hand performing light bilateral ADLs. MET  4)  Berg's score to be >40% to indicate effective prevention of scar adhesions and optimum glide of tendons. Progressing  5)   Edema to be decreased by .2 to .4 cm to evidence effective pain and edema management. MET  6)   Decrease complaints of pain to  3 out of 10 at worst while performing light stabilization tasks. Progressing  Pt would continue to benefit from skilled OT as per original POC.     MUSHTAQ is progressing as expected towards his goals and there are no updates to goals at this time. Pt prognosis is Good.     Pt will continue to benefit from skilled outpatient occupational therapy to address the deficits listed in the problem list on initial evaluation provide pt/family education and to maximize pt's level of independence in the home and community environment.     Pt's spiritual, cultural and educational needs considered and pt agreeable to plan of care and goals.    Plan   2/3: R/A girth. R/A LF KATZ and TPM. Modify IPJ and DIP static progressive extension splints and flexion strap prn. Upgrade size of LMB prn.     Pt to be treated by Occupational Therapy 2-3 times per week for 10-12 weeks during the certification period from 11/2/2021 to 03/02/2022 to achieve the established goals.      Treatment to include: Paraffin, Fluidotherapy, Manual therapy/joint  "mobilizations, Modalities for pain management, Therapeutic exercises/activities., Strengthening, Orthotic Fabrication/Fit/Training, Edema Control, Scar Management, Wound Care and Electrical Modalities, as well as any other treatments deemed necessary based on the patient's needs or progress.     Cat Anne \[facility]\""  Student Occupational Therapist    "I, YOSSI Montgomery CHT,  certify that I was present in the room directing the student in service delivery and guiding them using my skilled judgment. As the co-signing therapist, I have reviewed the student's documentation and am responsible for the treatment, assessment and plan."    YOSSI Montgomery CHT  Occupational Therapist, Certified Hand Therapist      "

## 2022-02-02 NOTE — PATIENT INSTRUCTIONS
[facility], OCCUPATIONAL THERAPY  HOME EXERCISE PROGRAM         PERFORM EXERCISES 1 TO 3 SETS OF 10, 4-6 TIMES PER DAY; ALWAYS PAIN-FREE. DON'T LET THE PICTURES PRESSURE YOU TO PUSH THROUGH PAINFUL RANGES.      PERFORM STRENGTHENING EXERCISES EVERY OTHER DAY; AND ALWAYS PAIN-FREE

## 2022-02-03 ENCOUNTER — CLINICAL SUPPORT (OUTPATIENT)
Dept: REHABILITATION | Facility: HOSPITAL | Age: 47
End: 2022-02-03
Attending: ORTHOPAEDIC SURGERY
Payer: COMMERCIAL

## 2022-02-03 DIAGNOSIS — M79.89 SWELLING OF LEFT MIDDLE FINGER: ICD-10-CM

## 2022-02-03 DIAGNOSIS — M25.642 DECREASED RANGE OF MOTION OF FINGER OF LEFT HAND: ICD-10-CM

## 2022-02-03 DIAGNOSIS — M79.645 PAIN OF LEFT MIDDLE FINGER: ICD-10-CM

## 2022-02-03 PROCEDURE — 97110 THERAPEUTIC EXERCISES: CPT | Mod: PN

## 2022-02-03 PROCEDURE — 97018 PARAFFIN BATH THERAPY: CPT | Mod: PN

## 2022-02-03 PROCEDURE — 97140 MANUAL THERAPY 1/> REGIONS: CPT | Mod: PN

## 2022-02-09 NOTE — PROGRESS NOTES
Occupational Therapy Daily Treatment Note     Date: 2/10/2022  Name: Armen Mcconnell  Clinic Number: [id number]    Therapy Diagnosis:   No diagnosis found.  Physician: Pratik Jain Jr., *    Medical Diagnosis: M86.9 (ICD-10-CM) - Osteomyelitis, unspecified site, unspecified type  S56.129A,S61.209A (ICD-10-CM) - Flexor tendon laceration of finger with open wound, initial encounter  Physician Orders: OT Eval & Tx; see 12/30/21 Plan below  Evaluation Date: 11/2/2021  Insurance Authorization Period Expiration: 12/31/2021  Plan of Care from 11/2/2021 to 03/02/2022   Surgery Date: 11/13/21 - Operative procedure:  Debridement excisional of bone left middle finger middle phalanx and distal phalanx including biopsy.  10/8/21 - Operative Procedure: 1. REPAIR OF FLEXOR DIGITORUM PROFUNDUS TENDON LACERATION LEFT MIDDLE FINGER.   2. REPAIR OF DIGITAL NERVE LACERATION X 2 LEFT MIDDLE FINGER.    Return to MD: 1/27/22 12/30/22: PLAN:  Even though he is off the IV antibiotics [place] 1 to keep him on Cipro for 2 more weeks 500 mg b.i.d.  I have ordered OT for range of motion gentle strengthening  He was given a squeeze ball today  Motrin twice a day for pain    Return to MD: 3/10/22  1/27/22: Ortho/SPM Exam  Examination left middle finger the wounds are all well healed swelling is minimal range of motion improved passively knee does have pretty good active flexion of the PIP but limited D IP flexion  PLAN: I would like him to continue occupational therapy as well as a home exercise program I have encouraged him to start using his hand more more including using a squeeze ball  I have prescribed Pennsaid topical anti-inflammatory cream use on the finger which should help with the swelling    Visit # / Visits authorized: 5 / 50  Time In: *** pm  Time Out: 4:50 pm  Total Billable Time: 54 minutes    Precautions:  Standard; see order specifics  Other considerations: Primary language is [place]. Wife (Significant Other) is fluent  in English and accompanies him for interpretation.   Family refused interpretation services offered on 2/3/22 explaining that they would not return to our clinic if we did not let family interpret.  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    Subjective     Pt reports: 2/3: Pt's wife (SO) confirms that the wear schedules for the LMB and the flexion strap were followed as modified last session; 3-4 minutes each 3x a day. The patient confirms wearing the static progressive IPJ extension orthosis while sleeping with no further sleep interruptions or pain. Pt arrives without the HEP media or splints. The Advil and ice had been used as directed by Dr. Jain since last seen on 2/2/22. Pt and wife leave understanding rationale, application and wear schedule instructed for the new DIP static progressive (clam shell) splint.    Response to previous treatment: Pt did follow HEP instructions for strengthening every other day and through pain-free ranges. Now with decreased joint pain and edema resulting.    Pain: 4/10 pain today when moving fingers 6/10 at worst; 0/10 at rest  Location: Left LF    Objective     Edema: Circumferential measurements: In centimters     IE-11/2/21 11/5/21 11/8/21 11/10/21 1/6/22 1/26/22 1/31/22 2/2/22     Right/Left Left Left L L L L L   Long (Middle) Finger :                  P1 7.1/8.7 8.2 (-.5) 8.1 (-.1) 7.8 (-.3) 7.1 7.5 (+0.4) 7.3 (-0.2) 6.9 (-0.4)    PIP 7.4/9.4 8.9 (-.5) 8.5 (-.4) 8.6 (+.1) 7.8 7.7 (-0.1) 7.9 (+0.2) 7.6 (-0.3)   P2         7.1 6.8 (-0.3) 6.5 (-0.3) 6.7 (+0.2)    DIP     defer   6.2 5.9 (-0.3) 6.1 (+0.2) 5.8 (-0.3)   P3         5.6 nt nt       Range of Motion:  Long (Middle) Finger AROM    Left Left Left Left Left Left Left Left   DATE IE-11/2/21 11/5/21 11/8/21 11/10/21 1/6/22 1/26/22  Post-tx 1/31/22  Pre-tx 2/3/22  Pre-tx    MP e/f -17/70 -7/90 -4/92 0/= 0/96 0/97 nt nt   PIP e/f -23/29 -15/42 -9/55 -7/50 -10/78 -11/82 * -18/82 -13/79  "  DIP e/f -18/20 -20/30 -17/32 -13/34 -21/25 -20/29 -15/30 -20/33   Berg's % 5% 21% 35% 37%        KATZ 61 120 (+59) 149 (+29) 156 (+7) 168 (+12) 177 (+21)      * (-15 post LMB x 2')    Long (Middle) Finger PROM    Left   DATE 2/3/22   MP e/f defer   PIP e/f -7/84   DIP e/f -8/35   TPM defer     AROM (measured in centimeters)  Tip to DPC Right/Left L   DATE IE-11/8/2021 1/6/22   Index  4.0 Touching palm    Ring   4.0 1   Small   4.0 2.5   Thumb TBA        Sensation: N/A Distribution    1/26/2022     LEFT   Monofilament Testing     Normal 1.65-2.83 IF, RF, SF & Thumb   Diminished Light Touch 3.22-3.61 LF ulna tip   Diminished Protective 3.84-4.31 LF radial tip   Loss of Protective 4.56-6.65     Untestable >6.65        Strength: (measured in psi.)    Right/Left    2/3/2022   Brigido Rung /54   Ariza Pinch 20/17   3-pt Pinch 24/12       Treatment     MUSHTAQ received the following supervised modalities after being cleared for contraindications for 8 minutes:   -defer- Fluidotherapy for promoting healing, reducing pain, desensitization and increasing tissue extensibility  -Paraffin wax heat pre-tx for promoting healing, decreasing pain and increasing tissue extensibility    MUSHTAQ received the following direct contact modalities after being cleared for contraindications for 0 minutes:  - defer U/S    MUSHTAQ received the following manual therapy techniques for 20 minutes:   - Scar massage  - Grade I-II IPJ mobilizations  - AA/PROM FDS glides  - AA/PROM DIP flexion  - AA/PROM ORL stretch  - R/A girth; checked wear schedule of size XXL digitsleeve for daytime and 1" compression sleeve for night time wear for edema mgmt  - AA/PROM DIP Extension f/b application of a static clam-shell splint targeted to be progressive DIP extension splint; wear schedule and precautions discussed in detail; instructions discussed and interpreted by pt's wife.    MUSHTAQ performed therapeutic activities and exercises for 20 " "minutes including:  TGEs, spreads and lifts Defer-Performed during final minutes of Fluidotherapy   IPJ extension stretch  2/3/22 Trial size B LMB for PIP, in-clinic only  2/3/22 Trial DIP clam shell, issued for HEP   IPJ Flexion stretch 1/26/22 Flexion strap fit checked; wear schedule and precautions discussed & provided in writing   Intrinsic and extrinsic extension strengthening 2 min each:  - lifts with adduction pumps using red sponges   Extrinsic flexor gliding and  strengthening 2 min each:  - 2 cm hook to full fist rolls  - Finger platter at DIP  - Finger platter at PIP  - defer-Yellow digiflex  - defer to HEP after pain resolves-Red t-putty 1x10 each:  - full   - hook    Intrinsic strengthening 2 min each with yellow spidyband  - isometric Lumbrical, positioned at P3  - spreads   LF ROM R/A 2/3/22 - R/A AROM   2/3/22 - Assessed baseline PROM   Strengthening for hand/ wrist, elbow and shoulder Yellow t-band positioned at hand level,1 min each:  - Wrist/digit extension with scapular retraction and shoulder external rotation  - Wrist ulnar deviation w/scap retract & shld ER  - Wrist radial deviation w/elbow & shld flexion  - wrist flexion with elbow & shld extension   - HEP upgraded on 2/3/22 and return demonstrated as follows:      -Yellow spidyband, 2 min each for:  -isometric lumbrical + placed at P3 & P2, each  -intrinsic & extrinsic LF extension strengthening   -Red t-putty strengthening for #2 & #3 DAB  -wrist/digit extrinsic isometric strengthening using Yellow t-band  -3 cm" dowel for composite flexion/ encouraging FDP glide and end range excursion          Armen and his wife received concurrent Self Care instructions and Orthosis Management for 10 minutes and participated in an ongoing and concurrent discussion of re/evaluation findings and specific home care, including:   IPJ extension; finger-based static progressive splint (finger gutter) Defer-Check out; pt left splint at " "home  2/3/22 Reviewed - Wear schedule: while sleeping only at this time; light strap tension only; 1" compression sleeve to be worn beneath the splint   PIP extension dynamic (size C LMB) Defer-Check out; pt left splint at home yet reports "loose" fitting now  2/3/22 - Trial fit of Size B, not recommended for this week  2/3/22 Reviewed - Wear schedule of size C: tolerance to be built in 1-2 min increments; not to exceed 10 min 3x/day   DIP Extension static progressive (Clam shell) 2/3/22 - Fabricated for reducing developing DIP flexion contracture. Wear schedule: tolerance to be built in 1-2 min increments; not to exceed 10 min 3x/day; best worn just prior to intrinsic strengthening   IPJ flexion dynamic (Elastic Flexion Strap) Check out -deferred-pt left it at home yet reports "loose" fitting now  2/3/22 Reviewed - Wear schedule: tolerance to be built in 1-2 min increments; not to exceed 10 min 3x/day       Home Exercise Program and Home Care Resources/Education:     Education provided:   - See Discussion & HEP as upgraded  - Education provided on all interventions performed during today's session   - Progress towards goals    Education provided in previous sessions:  - Monitor pain closely, DECREASE wear time of flexion strap and DISCONTINUE prolonged t-putty gripping at home   - discussion in activity modifications currently being used to handle heavy bilateral tasks at work; pt confirms using the heal of his hand to push rather than his LF flexors to  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   - Surgeon instructions for use of Advil and ice rec'd via private chat; OT spoke with wife then over the phone  - Check out and reviewed safe application of Flexion Strap; 2 minutes only, building wearing tolerance in 1 minute increments if Pain-free and not to exceed 10 minutes 3x/day  - discussion of factors that may influence increased joint swelling; Tx program and HEP adjusted/reviewed accordingly    Written Home Exercises Provided: yes.  Exercises were reviewed and MUSHTAQ was able to demonstrate them prior to the end of the session.  MUSHTAQ demonstrated good  understanding of the HEP provided.     See EMR under Patient Instructions for exercises provided 1/26/2022, 1/31/22, 2/3/22       Assessment   2/3: Less edema and pain in the LF IPJs today indicating good follow through with MD directives, HEP & splint wear scheduled as adjusted last session. Right  strength is as expected for post-op recovery. LF KTAZ as expected for development of new scar, adhesions from the mature scar and this recent episode of joint inflammation due to aggressive use of t-putty. Tx program and HEP upgraded accordingly. Wife and patient appeared satisfied after certified translation was discontinued and apologies made for misunderstanding their preferences for family-assisted translation.    Goals:   LTG's (8-10 weeks):  1)   Long (Middle) Finger KATZ to be > 80% of the unaffected hand to increase functional hand use for household I/ADLs.  2)    strength  to be >50% of the unaffected hand for safe handling of work tools and materials.  3)   Pinch strength  to be >60% of the unaffected hand for safe handling of locks, latches and fasteners.  4)   Decrease complaints of pain to  2 out of 10 at worst to increase functional hand use for ADL/work/leisure activities.  5)  Dexterity to be >80% of the unaffected hand as evidenced  by Grooved Peg Board test or patient report of (I) management of fasteners and customary self care containers.  6)   Patient to score at 25% or less on DASH or FOTO to demonstrate improved perception of functional left hand/UE use for return to near to prior level of function for I/ADLs and work.     STG's (4-6 weeks)  1)   Patient to be IND with HEP and orthotic use/wear/care and precautions. ONGOING  2)   Pt to tolerate advancing PREs with self-graded intensity and effective symptom monitoring. Progressing  3)   Long (Middle) Finger  KATZ to be increased by 50 degrees for reactivation of the affected hand performing light bilateral ADLs. MET  4)  Berg's score to be >40% to indicate effective prevention of scar adhesions and optimum glide of tendons. Progressing  5)   Edema to be decreased by .2 to .4 cm to evidence effective pain and edema management. MET  6)   Decrease complaints of pain to  3 out of 10 at worst while performing light stabilization tasks. Progressing  Pt would continue to benefit from skilled OT as per original POC.     MUSHTAQ is progressing as expected towards his goals and there are no updates to goals at this time. Pt prognosis is Good.     Pt will continue to benefit from skilled outpatient occupational therapy to address the deficits listed in the problem list on initial evaluation provide pt/family education and to maximize pt's level of independence in the home and community environment.     Pt's spiritual, cultural and educational needs considered and pt agreeable to plan of care and goals.    Plan   2/3: R/A girth. R/A LF KATZ and TPM. Modify IPJ and DIP static progressive extension splints and flexion strap prn. Upgrade size of LMB prn.     Pt to be treated by Occupational Therapy 2-3 times per week for 10-12 weeks during the certification period from 11/2/2021 to 03/02/2022 to achieve the established goals.      Treatment to include: Paraffin, Fluidotherapy, Manual therapy/joint  "mobilizations, Modalities for pain management, Therapeutic exercises/activities., Strengthening, Orthotic Fabrication/Fit/Training, Edema Control, Scar Management, Wound Care and Electrical Modalities, as well as any other treatments deemed necessary based on the patient's needs or progress.     [facility]  Student Occupational Therapist    "I, YOSSI Montgomery CHT,  certify that I was present in the room directing the student in service delivery and guiding them using my skilled judgment. As the co-signing therapist, I have reviewed the student's documentation and am responsible for the treatment, assessment and plan."    YOSSI Montgomery CHT  Occupational Therapist, Certified Hand Therapist      "

## 2022-02-10 ENCOUNTER — CLINICAL SUPPORT (OUTPATIENT)
Dept: REHABILITATION | Facility: HOSPITAL | Age: 47
End: 2022-02-10
Payer: COMMERCIAL

## 2022-02-10 DIAGNOSIS — M79.645 PAIN OF LEFT MIDDLE FINGER: ICD-10-CM

## 2022-02-10 DIAGNOSIS — M25.642 DECREASED RANGE OF MOTION OF FINGER OF LEFT HAND: ICD-10-CM

## 2022-02-10 DIAGNOSIS — M79.89 SWELLING OF LEFT MIDDLE FINGER: ICD-10-CM

## 2022-02-10 PROCEDURE — 97018 PARAFFIN BATH THERAPY: CPT | Mod: PN,59

## 2022-02-10 PROCEDURE — 97140 MANUAL THERAPY 1/> REGIONS: CPT | Mod: PN

## 2022-02-10 PROCEDURE — 97110 THERAPEUTIC EXERCISES: CPT | Mod: PN

## 2022-02-10 NOTE — PROGRESS NOTES
Occupational Therapy Daily Treatment Note     Date: 2/10/2022  Name: Armen Mcconnell  [facility] Number: [id number]    Therapy Diagnosis:   Encounter Diagnoses   Name Primary?    Pain of left middle finger     Swelling of left middle finger     Decreased range of motion of finger of left hand      Physician: Pratik Jain Jr., *    Medical Diagnosis: M86.9 (ICD-10-CM) - Osteomyelitis, unspecified site, unspecified type  S56.129A,S61.209A (ICD-10-CM) - Flexor tendon laceration of finger with open wound, initial encounter  Physician Orders: OT Eval & Tx; see 12/30/21 Plan below  Evaluation Date: 11/2/2021  Insurance Authorization Period Expiration: 12/31/2021  Plan of Care from 11/2/2021 to 03/02/2022   Surgery Date: 11/13/21 - Operative procedure:  Debridement excisional of bone left middle finger middle phalanx and distal phalanx including biopsy.  10/8/21 - Operative Procedure: 1. REPAIR OF FLEXOR DIGITORUM PROFUNDUS TENDON LACERATION LEFT MIDDLE FINGER.   2. REPAIR OF DIGITAL NERVE LACERATION X 2 LEFT MIDDLE FINGER.    Return to MD: 1/27/22 12/30/22: PLAN:  Even though he is off the IV antibiotics [place] 1 to keep him on Cipro for 2 more weeks 500 mg b.i.d.  I have ordered OT for range of motion gentle strengthening  He was given a squeeze ball today  Motrin twice a day for pain    Return to MD: 3/10/22  1/27/22: Ortho/SPM Exam  Examination left middle finger the wounds are all well healed swelling is minimal range of motion improved passively knee does have pretty good active flexion of the PIP but limited D IP flexion  PLAN: I would like him to continue occupational therapy as well as a home exercise program I have encouraged him to start using his hand more more including using a squeeze ball  I have prescribed Pennsaid topical anti-inflammatory cream use on the finger which should help with the swelling    Visit # / Visits authorized: 4 / 50  Time In: 3:05  pm  Time Out: 400 pm  Total Billable Time: 55  "minutes    Precautions:  Standard; see order specifics  Other considerations: Primary language is Welsh. Wife (Significant Other) is fluent in English and accompanies him for interpretation.   Family refused interpretation services offered on 2/3/22 explaining that they would not return to our clinic if we did not let family interpret.  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    Subjective     Pt reports:"Doing better at home with the hand and that he was careful with the theraputty so it did not increase his pain."    Response to previous treatment: Pt did follow HEP instructions for strengthening every other day and through pain-free ranges. Now with decreased joint pain and edema resulting.    Pain: 4/10 pain today when moving fingers 6/10 at worst; 0/10 at rest  Location: Left LF    Objective     Edema: Circumferential measurements: In centimters     IE-11/2/21 11/5/21 11/8/21 11/10/21 1/6/22 1/26/22 1/31/22 2/2/22     Right/Left Left Left L L L L L   Long (Middle) Finger :                  P1 7.1/8.7 8.2 (-.5) 8.1 (-.1) 7.8 (-.3) 7.1 7.5 (+0.4) 7.3 (-0.2) 6.9 (-0.4)    PIP 7.4/9.4 8.9 (-.5) 8.5 (-.4) 8.6 (+.1) 7.8 7.7 (-0.1) 7.9 (+0.2) 7.6 (-0.3)   P2         7.1 6.8 (-0.3) 6.5 (-0.3) 6.7 (+0.2)    DIP     defer   6.2 5.9 (-0.3) 6.1 (+0.2) 5.8 (-0.3)   P3         5.6 nt nt       Range of Motion:  Long (Middle) Finger AROM    Left Left Left Left Left Left Left Left   DATE IE-11/2/21 11/5/21 11/8/21 11/10/21 1/6/22 1/26/22  Post-tx 1/31/22  Pre-tx 2/3/22  Pre-tx    MP e/f -17/70 -7/90 -4/92 0/= 0/96 0/97 nt nt   PIP e/f -23/29 -15/42 -9/55 -7/50 -10/78 -11/82 * -18/82 -13/79   DIP e/f -18/20 -20/30 -17/32 -13/34 -21/25 -20/29 -15/30 -20/33   Morena's % 5% 21% 35% 37%        KATZ 61 120 (+59) 149 (+29) 156 (+7) 168 (+12) 177 (+21)      * (-15 post LMB x 2')    Long (Middle) Finger PROM    Left   DATE 2/3/22   MP e/f defer   PIP e/f -7/84   DIP e/f -8/35   TPM defer "     AROM (measured in centimeters)  Tip to DPC Right/Left L   DATE IE-11/8/2021 1/6/22   Index  4.0 Touching palm    Ring   4.0 1   Small   4.0 2.5   Thumb TBA        Sensation: N/A Distribution    1/26/2022     LEFT   Monofilament Testing     Normal 1.65-2.83 IF, RF, SF & Thumb   Diminished Light Touch 3.22-3.61 LF ulna tip   Diminished Protective 3.84-4.31 LF radial tip   Loss of Protective 4.56-6.65     Untestable >6.65        Strength: (measured in psi.)    Right/Left L    2/3/2022 2/10/22   Brigido Rung /54 114/73   Ariza Pinch 20/17 20   3-pt Pinch 24/12 16.5       Treatment     MUSHTAQ received the following supervised modalities after being cleared for contraindications for 8 minutes:   -defer- Fluidotherapy for promoting healing, reducing pain, desensitization and increasing tissue extensibility  -Paraffin wax heat pre-tx for promoting healing, decreasing pain and increasing tissue extensibility      MUSHTAQ received the following manual therapy techniques for 10 minutes:   - Scar massage  - Grade I-II IPJ mobilizations  - AA/PROM FDS glides  - AA/PROM DIP flexion  - AA/PROM ORL stretch      MUSHTAQ performed therapeutic activities and exercises for 37 minutes including:      Intrinsic and extrinsic extension strengthening 2 min each:  - lifts with adduction pumps using red sponges   Extrinsic flexor gliding and  strengthening 2 min each:  - 2 cm hook to full fist rolls  - Finger platter at DIP  - Finger platter at PIP    - defer to HEP after pain resolves-Red t-putty 1x10 each:  - full   - hook    Intrinsic strengthening 2 min each with Red spidyband  - isometric Lumbrical, positioned at P3  - spreads   Green t bar   Smileys  Frowns Rolling 2 min   2/10   2/10      Strengthening for hand/ wrist, elbow and shoulder Yellow t-band positioned at hand level,1 min each:  - Wrist/digit extension with scapular retraction and shoulder external rotation  - Wrist ulnar deviation w/scap retract &  shld ER  - Wrist radial deviation w/elbow & shld flexion  - wrist flexion with elbow & shld extension   - HEP upgraded on 2/10/22 and return demonstrated as follows:      -Yellow spidyband, 2 min each for:  -isometric lumbrical + placed at P3 & P2, each  -intrinsic & extrinsic LF extension strengthening   -Green-putty strengthening for #2 & #3 DAB  -wrist/digit extrinsic isometric strengthening using        2 min trials with LMB and intrinsic strap. LMB size B given today with ramp up time started at 2 min, Intrinsic strap not upgraded today.     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   - Surgeon instructions for use of Advil and ice rec'd via private chat; OT spoke with wife then over the phone  - Check out and reviewed safe application of Flexion Strap; 2 minutes only, building wearing tolerance in 1 minute increments if Pain-free and not to exceed  10 minutes 3x/day  - discussion of factors that may influence increased joint swelling; Tx program and HEP adjusted/reviewed accordingly    Written Home Exercises Provided: yes.  Exercises were reviewed and MUSHTAQ was able to demonstrate them prior to the end of the session.  MUSHTAQ demonstrated good  understanding of the HEP provided.     See EMR under Patient Instructions for exercises provided 1/26/2022, 1/31/22, 2/3/22       Assessment   Pt continues to do well with therapy. Minor ext lag at DIP of affected joint. Provided with upgraded LMB finger splint with 2 min trial at session. Educated of trials and wear and care at home to increase LLPS. Pt and wife understood. He would benefit from continued strengthening and activity tolerance exercises of the hand stating difficulty with prolonged grasp due to tightness still in the finger.     Goals:   LTG's (8-10 weeks):  1)   Long (Middle) Finger KATZ to be > 80% of the unaffected hand to increase functional hand use for household I/ADLs.  2)    strength  to be >50% of the unaffected hand for safe handling of work tools and materials.  3)   Pinch strength  to be >60% of the unaffected hand for safe handling of locks, latches and fasteners.  4)   Decrease complaints of pain to  2 out of 10 at worst to increase functional hand use for ADL/work/leisure activities.  5)  Dexterity to be >80% of the unaffected hand as evidenced by Grooved Peg Board test or patient report of (I) management of fasteners and customary self care containers.  6)   Patient to score at 25% or less on DASH or FOTO to demonstrate improved perception of functional left hand/UE use for return to near to prior level of function for I/ADLs and work.     STG's (4-6 weeks)  1)   Patient to be IND with HEP and orthotic use/wear/care and precautions. ONGOING  2)   Pt to tolerate advancing PREs with self-graded intensity and effective symptom monitoring. Progressing  3)   Long (Middle) Finger  KATZ  to be increased by 50 degrees for reactivation of the affected hand performing light bilateral ADLs. MET  4)  Berg's score to be >40% to indicate effective prevention of scar adhesions and optimum glide of tendons. Progressing  5)   Edema to be decreased by .2 to .4 cm to evidence effective pain and edema management. MET  6)   Decrease complaints of pain to  3 out of 10 at worst while performing light stabilization tasks. Progressing  Pt would continue to benefit from skilled OT as per original POC.     MUSHTAQ is progressing as expected towards his goals and there are no updates to goals at this time. Pt prognosis is Good.     Pt will continue to benefit from skilled outpatient occupational therapy to address the deficits listed in the problem list on initial evaluation provide pt/family education and to maximize pt's level of independence in the home and community environment.     Pt's spiritual, cultural and educational needs considered and pt agreeable to plan of care and goals.    Plan   Follow up LMB fit     Pt to be treated by Occupational Therapy 2-3 times per week for 10-12 weeks during the certification period from 11/2/2021 to 03/02/2022 to achieve the established goals.      Treatment to include: Paraffin, Fluidotherapy, Manual therapy/joint mobilizations, Modalities for pain management, Therapeutic exercises/activities., Strengthening, Orthotic Fabrication/Fit/Training, Edema Control, Scar Management, Wound Care and Electrical Modalities, as well as any other treatments deemed necessary based on the patient's needs or progress.     Deni العلي   OTR/L

## 2022-02-11 NOTE — PROGRESS NOTES
Occupational Therapy Daily Treatment Note     Date: 2/14/2022  Name: Armen Mcconnell  [facility] Number: [id number]    Therapy Diagnosis:   Encounter Diagnoses   Name Primary?    Pain of left middle finger     Swelling of left middle finger     Decreased range of motion of finger of left hand      Physician: Pratik Jain Jr., *    Medical Diagnosis: M86.9 (ICD-10-CM) - Osteomyelitis, unspecified site, unspecified type  S56.129A,S61.209A (ICD-10-CM) - Flexor tendon laceration of finger with open wound, initial encounter  Physician Orders: OT Eval & Tx; see 12/30/21 Plan below  Evaluation Date: 11/2/2021  Insurance Authorization Period Expiration: 12/31/2021  Plan of Care from 11/2/2021 to 03/02/2022   Surgery Date: 11/13/21 - Operative procedure:  Debridement excisional of bone left middle finger middle phalanx and distal phalanx including biopsy.  10/8/21 - Operative Procedure: 1. REPAIR OF FLEXOR DIGITORUM PROFUNDUS TENDON LACERATION LEFT MIDDLE FINGER.   2. REPAIR OF DIGITAL NERVE LACERATION X 2 LEFT MIDDLE FINGER.    Return to MD: 1/27/22 12/30/22: PLAN:  Even though he is off the IV antibiotics [place] 1 to keep him on Cipro for 2 more weeks 500 mg b.i.d.  I have ordered OT for range of motion gentle strengthening  He was given a squeeze ball today  Motrin twice a day for pain    Return to MD: 3/10/22  1/27/22: Ortho/SPM Exam  Examination left middle finger the wounds are all well healed swelling is minimal range of motion improved passively knee does have pretty good active flexion of the PIP but limited D IP flexion  PLAN: I would like him to continue occupational therapy as well as a home exercise program I have encouraged him to start using his hand more more including using a squeeze ball  I have prescribed Pennsaid topical anti-inflammatory cream use on the finger which should help with the swelling    Visit # / Visits authorized: 5 / 50  Time In: 3:00 pm  Time Out: 3:50  pm  Total Billable Time: 45  minutes    Precautions:  Standard; see order specifics  Other considerations: Primary language is Algerian. Wife (Significant Other) is fluent in English and accompanies him for interpretation.   Family refused interpretation services offered on 2/3/22 explaining that they would not return to our clinic if we did not let family interpret.  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    Subjective     Pt reports: Pt reports wearing the size B LMB for 6 minutes 3x a day, and the flexion strap for 10 minutes 3x a day. His wife states that he completes his strengthening HEP every other day as instructed. 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     Response to previous treatment: Pt did follow HEP instructions for strengthening every other day and through pain-free ranges. Now with decreased joint pain and edema resulting.    Pain: 4/10 pain today when left LF joints are bent for prolonged periods. 6/10 at worst; 0/10 at rest  Location: Left LF    Objective   Observations: increased stiffness in left LF DIP joint    Edema: Circumferential measurements: In centimters     IE-11/2/21 11/5/21 11/8/21 11/10/21 1/6/22 1/26/22 1/31/22 2/2/22 2/14/22     Right/Left Left Left L L L L L L   Long (Middle) Finger :                   P1 7.1/8.7 8.2 (-.5) 8.1 (-.1) 7.8 (-.3) 7.1 7.5 (+0.4) 7.3 (-0.2) 6.9 (-0.4) 7.0 (+0.1)    PIP 7.4/9.4 8.9 (-.5) 8.5 (-.4) 8.6 (+.1) 7.8 7.7 (-0.1) 7.9 (+0.2) 7.6 (-0.3) 7.5 (-0.1)   P2         7.1 6.8 (-0.3) 6.5 (-0.3) 6.7 (+0.2) 6.4 (-0.3)    DIP     defer   6.2 5.9 (-0.3) 6.1 (+0.2) 5.8 (-0.3) 5.6 (-0.2)   P3         5.6 nt nt        Range of Motion:  Long (Middle) Finger AROM    Left Left Left Left Left Left Left Left Left   DATE IE-11/2/21 11/5/21 11/8/21 11/10/21 1/6/22 1/26/22  Post-tx 1/31/22  Pre-tx 2/3/22  Pre-tx  2/14/22  Pre-tx   MP e/f -17/70 -7/90 -4/92 0/= 0/96 0/97 nt  "nt 4+/101   PIP e/f -23/29 -15/42 -9/55 -7/50 -10/78 -11/82 * -18/82 -13/79 -12/75   DIP e/f -18/20 -20/30 -17/32 -13/34 -21/25 -20/29 -15/30 -20/33 -17/25   Berg's % 5% 21% 35% 37%         KATZ 61 120 (+59) 149 (+29) 156 (+7) 168 (+12) 177 (+21)   172    * (-15 post LMB x 2')    Long (Middle) Finger PROM    Left Left   DATE 2/3/22 2/14/22   MP e/f defer 110   PIP e/f -7/84 -6/89   DIP e/f -8/35 -11/45   TPM defer 237     AROM (measured in centimeters)  Tip to DPC Right/Left L   DATE IE-11/8/2021 1/6/22   Index  4.0 Touching palm    Ring   4.0 1   Small   4.0 2.5   Thumb TBA        Sensation: N/A Distribution    1/26/2022     LEFT   Monofilament Testing     Normal 1.65-2.83 IF, RF, SF & Thumb   Diminished Light Touch 3.22-3.61 LF ulna tip   Diminished Protective 3.84-4.31 LF radial tip   Loss of Protective 4.56-6.65     Untestable >6.65        Strength: (measured in psi.)    Right/Left L    2/3/2022 2/10/22   Brigido Rung /54 114/73 (+19)   Ariza Pinch 20/17 20 (+3)   3-pt Pinch 24/12 16.5 (+4.5)       Treatment     MUSHTAQ received the following supervised modalities after being cleared for contraindications for 8 minutes:   -defer- Fluidotherapy for promoting healing, reducing pain, desensitization and increasing tissue extensibility  -Paraffin wax heat pre-tx for promoting healing, decreasing pain and increasing tissue extensibility    MUSHTAQ received the following manual therapy techniques for 20 minutes:   - Scar massage  - Grade III-IV DIP and IPJ mobilizations  - AA/PROM FDS glides  - AA/PROM DIP flexion   - PROM ORL stretch  - R/A girth; defer-checked wear schedule of size XXL digitsleeve for daytime and 1" compression sleeve for night time wear for edema mgmt  - AA/PROM DIP Extension f/b application of a static clam-shell splint targeted to be progressive DIP extension splint; wear schedule and precautions discussed in detail; instructions discussed and interpreted by pt's wife.    MUSHTAQ " performed therapeutic activities and exercises for 17 minutes including:  Coban flexion wrap to PIP and DIP Concurrent with Paraffin wax x 8 minutes   Intrinsic and extrinsic extension strengthening 2 min each:  - lifts with adduction pumps using red sponges  -defer-Intrinsic stretch 3 x 30 seconds   Extrinsic flexor gliding and  strengthening 2 min each:  - 2 cm hook to full fist rolls  - Finger platter at DIP  - Finger platter at PIP    - defer to HEP after pain resolves-Red t-putty 1x10 each:  - full   - hook    Intrinsic strengthening 2 min each with Red spidyband  - defer-isometric Lumbrical, positioned at P3  - spreads   Defer-Green t bar   Smileys  Frowns Rolling 2 min   2/10   2/10      Strengthening for hand/ wrist, elbow and shoulder Deferred to HEP 2/14/22:  Yellow t-band positioned at hand level,1 min each:  - Wrist/digit extension with scapular retraction and shoulder external rotation  - Wrist ulnar deviation w/scap retract & shld ER  - Wrist radial deviation w/elbow & shld flexion  - wrist flexion with elbow & shld extension   - HEP upgraded on 2/10/22 and return demonstrated as follows:      -Yellow spidyband, 2 min each for:  -isometric lumbrical + placed at P3 & P2, each  -intrinsic & extrinsic LF extension strengthening    -Green-putty strengthening for #2 & #3 DAB  -wrist/digit extrinsic isometric strengthening using      Flexion strap 2/14: Modified for increasing tension.   2 minute trial in clinic; pt reports no pain after trial, soreness resolved one minute after trial; Wear schedule begins at 2 minutes, not to exceed 30 minutes 3x a day; increasing tolerance by 1 minute increments if no pain. Pt and pt's wife verbalize instructions   IPJ static progressive extension splints Checked wear schedule and fit; replaced frayed velcro straps.   LMB  Check fit and wear schedule of size B LMB.     2/10/22: 2 min trials with LMB and intrinsic strap. LMB size B given today with ramp up time  started at 2 min, Intrinsic strap not upgraded today.     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   - Surgeon instructions for use of Advil and ice rec'd via private chat; OT spoke with wife then over the phone  - Check out and reviewed safe application of Flexion Strap; 2 minutes only, building wearing tolerance in 1 minute increments if Pain-free and not to exceed 10 minutes 3x/day  - discussion of factors that may influence increased joint swelling; Tx program and HEP adjusted/reviewed accordingly    Written Home Exercises Provided: Patient instructed to cont prior HEP.  Exercises were reviewed and MUSHTAQ was able to demonstrate them prior to the end of the session.  MUSHTAQ demonstrated good  understanding of the HEP provided.     See EMR under Patient Instructions for exercises provided 1/26/2022, 1/31/22, 2/3/22       Assessment   2/14:  Measurements are indicative of ORL shortening, FDP weakness & decreased tendon excursion and intrinsic weakness.    Goals:   LTG's (8-10 weeks):  1)   Long (Middle) Finger KATZ to be > 80% of the unaffected hand to increase functional hand use for household I/ADLs.  2)    strength  to be >50% of the unaffected hand for safe handling of work tools and materials.  3)   Pinch strength  to be >60% of the unaffected hand for safe handling of locks, latches and fasteners.  4)   Decrease complaints of pain to  2 out of 10 at worst to increase functional hand use for ADL/work/leisure activities.  5)  Dexterity to be >80% of the unaffected hand as evidenced by Grooved Peg Board test or patient report of (I) management of fasteners and customary self care containers.  6)   Patient to score at 25% or less on DASH or FOTO to demonstrate improved perception of functional left hand/UE use for return to near to prior level of function for I/ADLs and work.     STG's (4-6 weeks)  1)   Patient to be IND with HEP and orthotic use/wear/care and precautions. ONGOING  2)   Pt to tolerate advancing PREs with self-graded intensity and effective symptom monitoring. Progressing  3)   Long (Middle) Finger  KATZ to be increased by 50 degrees for reactivation of the affected hand performing light bilateral ADLs. MET  4)  Berg's score to be >40% to indicate effective prevention of scar adhesions and optimum glide of tendons. Progressing  5)   Edema to be decreased by .2 to .4 cm to evidence effective pain and edema management. MET  6)   Decrease complaints of pain to  3 out of 10 at worst while performing light stabilization tasks. Progressing  Pt would continue to benefit from skilled OT as per original POC.     MUSHTAQ is progressing as expected towards his goals and there are no updates to goals at this time. Pt prognosis is Good.     Pt will continue to benefit from skilled outpatient occupational therapy to address the deficits  "listed in the problem list on initial evaluation provide pt/family education and to maximize pt's level of independence in the home and community environment.     Pt's spiritual, cultural and educational needs considered and pt agreeable to plan of care and goals.    Plan   2/14: R/A digit AROM and  and pinch. Consider Ultrasound addressing scar adhesions. Modify IPJ and DIP static progressive extension splints prn.     Pt to be treated by Occupational Therapy 2-3 times per week for 10-12 weeks during the certification period from 11/2/2021 to 03/02/2022 to achieve the established goals.      Treatment to include: Paraffin, Fluidotherapy, Manual therapy/joint mobilizations, Modalities for pain management, Therapeutic exercises/activities., Strengthening, Orthotic Fabrication/Fit/Training, Edema Control, Scar Management, Wound Care and Electrical Modalities, as well as any other treatments deemed necessary based on the patient's needs or progress.    TAYLOR Erickson  Student Occupational Therapist    "I, YOSSI Montgomery, KEATON,  certify that I was present in the room directing the student in service delivery and guiding them using my skilled judgment. As the co-signing therapist, I have reviewed the student's documentation and am responsible for the treatment, assessment and plan."    YOSSI Bailey CHT  Occupational Therapist, Certified Hand Therapist          "

## 2022-02-14 ENCOUNTER — CLINICAL SUPPORT (OUTPATIENT)
Dept: REHABILITATION | Facility: HOSPITAL | Age: 47
End: 2022-02-14
Payer: COMMERCIAL

## 2022-02-14 DIAGNOSIS — M79.89 SWELLING OF LEFT MIDDLE FINGER: ICD-10-CM

## 2022-02-14 DIAGNOSIS — M79.645 PAIN OF LEFT MIDDLE FINGER: ICD-10-CM

## 2022-02-14 DIAGNOSIS — M25.642 DECREASED RANGE OF MOTION OF FINGER OF LEFT HAND: ICD-10-CM

## 2022-02-14 PROCEDURE — 97018 PARAFFIN BATH THERAPY: CPT | Mod: PN

## 2022-02-14 PROCEDURE — 97110 THERAPEUTIC EXERCISES: CPT | Mod: PN

## 2022-02-14 PROCEDURE — 97140 MANUAL THERAPY 1/> REGIONS: CPT | Mod: PN

## 2022-02-23 ENCOUNTER — CLINICAL SUPPORT (OUTPATIENT)
Dept: REHABILITATION | Facility: HOSPITAL | Age: 47
End: 2022-02-23
Payer: COMMERCIAL

## 2022-02-23 DIAGNOSIS — M79.89 SWELLING OF LEFT MIDDLE FINGER: ICD-10-CM

## 2022-02-23 DIAGNOSIS — M25.642 DECREASED RANGE OF MOTION OF FINGER OF LEFT HAND: ICD-10-CM

## 2022-02-23 DIAGNOSIS — M79.645 PAIN OF LEFT MIDDLE FINGER: Primary | ICD-10-CM

## 2022-02-23 PROCEDURE — 97140 MANUAL THERAPY 1/> REGIONS: CPT | Mod: PN

## 2022-02-23 PROCEDURE — 97018 PARAFFIN BATH THERAPY: CPT | Mod: PN

## 2022-02-23 PROCEDURE — 97110 THERAPEUTIC EXERCISES: CPT | Mod: PN

## 2022-02-23 NOTE — PROGRESS NOTES
Occupational Therapy Daily Treatment Note     Date: 2/23/2022  Name: Armen Mcconnell  [facility] Number: [id number]    Therapy Diagnosis:   Encounter Diagnoses   Name Primary?    Pain of left middle finger Yes    Decreased range of motion of finger of left hand     Swelling of left middle finger      Physician: Pratik Jain Jr., *    Medical Diagnosis: M86.9 (ICD-10-CM) - Osteomyelitis, unspecified site, unspecified type  S56.129A,S61.209A (ICD-10-CM) - Flexor tendon laceration of finger with open wound, initial encounter  Physician Orders: OT Eval & Tx; see 12/30/21 Plan below  Evaluation Date: 11/2/2021  Insurance Authorization Period Expiration: 12/31/2021  Plan of Care from 11/2/2021 to 03/02/2022   Surgery Date: 11/13/21 - Operative procedure:  Debridement excisional of bone left middle finger middle phalanx and distal phalanx including biopsy.  10/8/21 - Operative Procedure: 1. REPAIR OF FLEXOR DIGITORUM PROFUNDUS TENDON LACERATION LEFT MIDDLE FINGER.   2. REPAIR OF DIGITAL NERVE LACERATION X 2 LEFT MIDDLE FINGER.    Return to MD: 1/27/22 12/30/22: PLAN:  Even though he is off the IV antibiotics [place] 1 to keep him on Cipro for 2 more weeks 500 mg b.i.d.  I have ordered OT for range of motion gentle strengthening  He was given a squeeze ball today  Motrin twice a day for pain    Return to MD: 3/10/22  1/27/22: Ortho/SPM Exam  Examination left middle finger the wounds are all well healed swelling is minimal range of motion improved passively knee does have pretty good active flexion of the PIP but limited D IP flexion  PLAN: I would like him to continue occupational therapy as well as a home exercise program I have encouraged him to start using his hand more more including using a squeeze ball  I have prescribed Pennsaid topical anti-inflammatory cream use on the finger which should help with the swelling    Visit # / Visits authorized: 6 / 50  Time In: 3:55 pm  Time Out: 4:35 pm  Total Billable Time:  "45 minutes    Precautions:  Standard; see order specifics  Other considerations: Primary language is Italian. Wife (Significant Other) is fluent in English and accompanies him for interpretation.   Family refused interpretation services offered on 2/3/22 explaining that they would not return to our clinic if we did not let family interpret.  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    Subjective     Pt reports: Pt arrives with all HEP media. His wife is present and provides interpretation. Armen states that he notices a pain-free "click" approximately 1x a day in his left LF DIP.     Pt reports wearing the size B LMB for 10 minutes 3x a day, and the flexion strap for 10 minutes 3x a day. His wife states that he completes his strengthening HEP every other day as instructed.      Response to previous treatment: Patient reports increased ability to open the car door. Pt did follow HEP instructions for strengthening every other day and through pain-free ranges.     Pain: 5/10 pain today when left LF joints are bent for prolonged periods. 6/10 at worst; 0/10 at rest  Location: Left LF    Objective   Observations: increased stiffness in left LF DIP joint    Edema: Circumferential measurements: In centimters     IE-11/2/21 11/5/21 11/8/21 11/10/21 1/6/22 1/26/22 1/31/22 2/2/22 2/14/22 2/23/22     Right/Left Left Left L L L L L L L   Long (Middle) Finger :                    P1 7.1/8.7 8.2 (-.5) 8.1 (-.1) 7.8 (-.3) 7.1 7.5 (+0.4) 7.3 (-0.2) 6.9 (-0.4) 7.0 (+0.1) 6.8 (-0.2)    PIP 7.4/9.4 8.9 (-.5) 8.5 (-.4) 8.6 (+.1) 7.8 7.7 (-0.1) 7.9 (+0.2) 7.6 (-0.3) 7.5 (-0.1) 7.5 (=)   P2         7.1 6.8 (-0.3) 6.5 (-0.3) 6.7 (+0.2) 6.4 (-0.3) 6.6 (-0.2)    DIP     defer   6.2 5.9 (-0.3) 6.1 (+0.2) 5.8 (-0.3) 5.6 (-0.2) 5.6 (=)   P3         5.6 nt nt         Range of Motion:  Long (Middle) Finger AROM    Left Left Left Left Left Left Left Left Left Left   DATE IE-11/2/21 11/5/21 11/8/21 11/10/21 " "1/6/22 1/26/22  Post-tx 1/31/22  Pre-tx 2/3/22  Pre-tx  2/14/22  Pre-tx 2/23/22   MP e/f -17/70 -7/90 -4/92 0/= 0/96 0/97 nt nt 4+/101 103   PIP e/f -23/29 -15/42 -9/55 -7/50 -10/78 -11/82 * -18/82 -13/79 -12/75 -11/86   DIP e/f -18/20 -20/30 -17/32 -13/34 -21/25 -20/29 -15/30 -20/33 -17/25 -17/28   Morena's % 5% 21% 35% 37%          KATZ 61 120 (+59) 149 (+29) 156 (+7) 168 (+12) 177 (+21)   172 189 (+17)    * (-15 post LMB x 2')    Long (Middle) Finger PROM    Left Left   DATE 2/3/22 2/14/22   MP e/f defer 110   PIP e/f -7/84 -6/89   DIP e/f -8/35 -11/45   TPM defer 237     AROM (measured in centimeters)  Tip to DPC Right/Left L   DATE IE-11/8/2021 1/6/22   Index  4.0 Touching palm    Ring   4.0 1   Small   4.0 2.5   Thumb TBA        Sensation: N/A Distribution    1/26/2022     LEFT   Monofilament Testing     Normal 1.65-2.83 IF, RF, SF & Thumb   Diminished Light Touch 3.22-3.61 LF ulna tip   Diminished Protective 3.84-4.31 LF radial tip   Loss of Protective 4.56-6.65     Untestable >6.65        Strength: (measured in psi.)    Right/Left L    2/3/2022 2/10/22   Brigido Rung /54 114/73 (+19)   Ariza Pinch 20/17 20 (+3)   3-pt Pinch 24/12 16.5 (+4.5)       Treatment     MUSHTAQ received the following supervised modalities after being cleared for contraindications for 8 minutes:   -defer- Fluidotherapy for promoting healing, reducing pain, desensitization and increasing tissue extensibility  -Paraffin wax heat pre-tx for promoting healing, decreasing pain and increasing tissue extensibility    MUSHTAQ received the following manual therapy techniques for 25 minutes:   - Scar massage  - Grade III-IV DIP mobilizations  - AA/PROM FDS glides  - AA/PROM DIP flexion   - PROM ORL stretch  - R/A girth; defer-checked wear schedule of size XXL digitsleeve for daytime and 1" compression sleeve for night time wear for edema mgmt  - AA/PROM DIP Extension defer-f/b application of a static clam-shell splint targeted to " be progressive DIP extension splint; wear schedule and precautions discussed in detail; instructions discussed and interpreted by pt's wife.  -continuous U/S  to right LF volar and dorsal surface and incision site with 3.3 MHz @  .6 w/cm2 x 8 minutes     MUSHTAQ performed therapeutic activities and exercises for 12 minutes including:  Coban flexion wrap to PIP and DIP Concurrent with Paraffin wax x 8 minutes   Extrinsic flexor gliding and  strengthening 2 min each:  - 2 cm hook to full fist rolls  - Finger platter at DIP  - Finger platter at PIP     Flexion strap Checked wear schedule: currently 10 minutes 3x a day    IPJ static progressive extension splints Checked wear schedule and fit; Added cushion to P3 for increased comfort and fit.    LMB  Checked wear schedule of size B LMB, currently 10 minutes 3x a day   Objective Measures R/A LF AROM     2/10/22: 2 min trials with LMB and intrinsic strap. LMB size B given today with ramp up time started at 2 min, Intrinsic strap not upgraded today.     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   - Surgeon instructions for use of Advil and ice rec'd via private chat; OT spoke with wife then over the phone  - Check out and reviewed safe application of Flexion Strap; 2 minutes only, building wearing tolerance in 1 minute increments if Pain-free and not to exceed 10 minutes 3x/day  - discussion of factors that may influence increased joint swelling; Tx program and HEP adjusted/reviewed accordingly    Written Home Exercises Provided: Patient instructed to cont prior HEP.  Exercises were reviewed and MUSHTAQ was able to demonstrate them prior to the end of the session.  MUSHTAQ demonstrated good  understanding of the HEP provided.     See EMR under Patient Instructions for exercises provided 1/26/2022, 1/31/22, 2/3/22       Assessment   Mushtaq presents today with increased stiffness in his left long finger DIP. However, he exhibits increased active movement after parrafin wax and ultrasound modalities. His tendon repair is intact, as his DIP exhibits AROM; however, fibrious scar adhesions limit his motion. Focus remains on increasing strength of the long finger for increased functional use of his hand.    2/14: Measurements are indicative of ORL shortening, FDP weakness & decreased tendon excursion and intrinsic weakness.    Goals:   LTG's (8-10 weeks):  1)   Long (Middle) Finger KATZ to be > 80% of the unaffected hand to increase functional hand use for household I/ADLs.  2)    strength  to be >50% of the unaffected hand for safe handling of work tools and materials.  3)   Pinch strength  to be >60% of the unaffected hand for safe handling of locks, latches and fasteners.  4)   Decrease complaints of pain to  2 out of 10 at worst to increase functional hand use for ADL/work/leisure activities.  5)  Dexterity to be >80% of the  unaffected hand as evidenced by Grooved Peg Board test or patient report of (I) management of fasteners and customary self care containers.  6)   Patient to score at 25% or less on DASH or FOTO to demonstrate improved perception of functional left hand/UE use for return to near to prior level of function for I/ADLs and work.     STG's (4-6 weeks)  1)   Patient to be IND with HEP and orthotic use/wear/care and precautions. ONGOING  2)   Pt to tolerate advancing PREs with self-graded intensity and effective symptom monitoring. Progressing  3)   Long (Middle) Finger  KATZ to be increased by 50 degrees for reactivation of the affected hand performing light bilateral ADLs. MET  4)  Berg's score to be >40% to indicate effective prevention of scar adhesions and optimum glide of tendons. Progressing  5)   Edema to be decreased by .2 to .4 cm to evidence effective pain and edema management. MET  6)   Decrease complaints of pain to  3 out of 10 at worst while performing light stabilization tasks. Progressing  Pt would continue to benefit from skilled OT as per original POC.     MUSHTAQ is progressing as expected towards his goals and there are no updates to goals at this time. Pt prognosis is Good.     Pt will continue to benefit from skilled outpatient occupational therapy to address the deficits listed in the problem list on initial evaluation provide pt/family education and to maximize pt's level of independence in the home and community environment.     Pt's spiritual, cultural and educational needs considered and pt agreeable to plan of care and goals.    Plan   R/A  and pinch. Continue PREs emphasizing intrinsic and isolated FDP strengthening.       Pt to be treated by Occupational Therapy 2-3 times per week for 10-12 weeks during the certification period from 11/2/2021 to 03/02/2022 to achieve the established goals.      Treatment to include: Paraffin, Fluidotherapy, Manual therapy/joint mobilizations,  "Modalities for pain management, Therapeutic exercises/activities., Strengthening, Orthotic Fabrication/Fit/Training, Edema Control, Scar Management, Wound Care and Electrical Modalities, as well as any other treatments deemed necessary based on the patient's needs or progress.    [facility]  Student Occupational Therapist    "I, YOSSI Zamora,  certify that I was present in the room directing the student in service delivery and guiding them using my skilled judgment. As the co-signing therapist, I have reviewed the student's documentation and am responsible for the treatment, assessment and plan."    YOSSI Zamora,   Occupational Therapist      "

## 2022-02-27 NOTE — PROGRESS NOTES
Occupational Therapy Daily Treatment Note     Date: 2/28/2022  Name: Armen Mcconnell  Clinic Number: [id number]    Therapy Diagnosis:   No diagnosis found.  Physician: Pratik Jain Jr., *    Medical Diagnosis: M86.9 (ICD-10-CM) - Osteomyelitis, unspecified site, unspecified type  S56.129A,S61.209A (ICD-10-CM) - Flexor tendon laceration of finger with open wound, initial encounter  Physician Orders: OT Eval & Tx; see 12/30/21 Plan below  Evaluation Date: 11/2/2021  Insurance Authorization Period Expiration: 12/31/2021  Plan of Care from 11/2/2021 to 03/02/2022   Surgery Date: 11/13/21 - Operative procedure:  Debridement excisional of bone left middle finger middle phalanx and distal phalanx including biopsy.  10/8/21 - Operative Procedure: 1. REPAIR OF FLEXOR DIGITORUM PROFUNDUS TENDON LACERATION LEFT MIDDLE FINGER.   2. REPAIR OF DIGITAL NERVE LACERATION X 2 LEFT MIDDLE FINGER.  Return to MD: 3/10/22    Visit # / Visits authorized: 7 / 50  QuickDASH 2/28/22 = 34%, Work Module = 75%    Time In: 3:00 pm  Time Out: 4:10 pm  Total Billable Time: 60 minutes    Precautions:  Standard; see order specifics  Other considerations: Primary language is Persian. Wife (Significant Other) is fluent in English and accompanies him for interpretation.   Family refused interpretation services offered on 2/3/22 explaining that they would not return to our clinic if we did not let family interpret.  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    Subjective     Pt reports: 2/28: Pain reported with deep scar massage. Pt leaves understanding HEP as upgraded based on measurements taken today. Wife provided translation.     Response to previous treatment: Pt did follow HEP instructions for strengthening every other day and through pain-free ranges.     Functional Improvements: See QuickDASH. Patient reports increased ability to open the car door.     Pain: 5/10 pain today when left LF joints are  bent for prolonged periods. 6/10 at worst; 0/10 at rest  Location: Left LF    Objective   Observations: increased stiffness in left LF DIP joint    Edema: Circumferential measurements: In centimters     IE-11/2/21 11/5/21 11/8/21 11/10/21 1/6/22 1/26/22 1/31/22 2/2/22 2/14/22 2/23/22     Right/Left Left Left L L L L L L L   Long (Middle) Finger :                    P1 7.1/8.7 8.2 (-.5) 8.1 (-.1) 7.8 (-.3) 7.1 7.5 (+0.4) 7.3 (-0.2) 6.9 (-0.4) 7.0 (+0.1) 6.8 (-0.2)    PIP 7.4/9.4 8.9 (-.5) 8.5 (-.4) 8.6 (+.1) 7.8 7.7 (-0.1) 7.9 (+0.2) 7.6 (-0.3) 7.5 (-0.1) 7.5 (=)   P2         7.1 6.8 (-0.3) 6.5 (-0.3) 6.7 (+0.2) 6.4 (-0.3) 6.6 (-0.2)    DIP     defer   6.2 5.9 (-0.3) 6.1 (+0.2) 5.8 (-0.3) 5.6 (-0.2) 5.6 (=)   P3         5.6 nt nt         Range of Motion:  Long (Middle) Finger AROM    Left Left Left Left Left Left Left Left Left Left Left   DATE IE-11/2/21 11/5/21 11/8/21 11/10/21 1/6/22 1/26/22  Post-tx 1/31/22  Pre-tx 2/3/22  Pre-tx  2/14/22  Pre-tx 2/23/22 2/28/22   MP e/f -17/70 -7/90 -4/92 0/= 0/96 0/97 nt nt 4+/101 103 102   PIP e/f -23/29 -15/42 -9/55 -7/50 -10/78 -11/82 * -18/82 -13/79 -12/75 -11/86 -8/84   DIP e/f -18/20 -20/30 -17/32 -13/34 -21/25 -20/29 -15/30 -20/33 -17/25 -17/28 -20/28   Berg's % 5% 21% 35% 37%        48%   KATZ 61 120 (+59) 149 (+29) 156 (+7) 168 (+12) 177 (+21)   172 189 (+17) 186 (-3)    * (-15 post LMB x 2')    Long (Middle) Finger PROM    Left Left Left   DATE 2/3/22 2/14/22 2/28/22  Post-tx   MP e/f defer 110 112   PIP e/f -7/84 -6/89 -6/89   DIP e/f -8/35 -11/45 -9/44   TPM defer 227 230 (+3)     AROM (measured in centimeters)  Tip to DPC Right/Left L Left   DATE IE-11/8/2021 1/6/22 2/28/22   Index  4.0 Touching palm  (=)   Ring   4.0 1 0 (+1.0)   Small   4.0 2.5 0 (+2.5)   Thumb TBA         Sensation: N/A Distribution    1/26/2022     LEFT   Monofilament Testing     Normal 1.65-2.83 IF, RF, SF & Thumb   Diminished Light Touch 3.22-3.61 LF ulna tip   Diminished Protective  "3.84-4.31 LF radial tip   Loss of Protective 4.56-6.65     Untestable >6.65        Strength: (measured in psi.)    Right/Left L Left    2/3/2022 2/10/22    Brigido Rung /54 114/73 (+19)    Ariza Pinch 20/17 20 (+3)    3-pt Pinch 24/12 16.5 (+4.5)        Treatment     MUSHTAQ received the following supervised modalities after being cleared for contraindications for 8 minutes:   -Fluidotherapy for promoting healing, reducing pain, desensitization and increasing tissue extensibility  - defer-Paraffin wax heat pre-tx for promoting healing, decreasing pain and increasing tissue extensibility    MUSHTAQ received the following direct contact modalities after being cleared for contraindications for 7 minutes:  - U/S @ .7 w/cm2 using 1cm soundhead @ 3.3Mhz; volar & dorsal Left LF DIP & PIP    MUSHTAQ received the following manual therapy techniques for 15 minutes:   - Scar massage w/dycem assistance  - Fit and issued size Lg/XLg Silipose sleeve, instructions to wear beneath night-time IPJ extension splint  - IAS  - Grade III-IV DIP mobilizations  - defer-AA/PROM FDS glides  - defer-AA/PROM DIP flexion   - PROM ORL stretch  - defer-R/A girth; defer-checked wear schedule of size XXL digitsleeve for daytime and 1" compression sleeve for night time wear for edema mgmt  - AA/PROM DIP Extension    MUSHTAQ performed therapeutic activities and exercises for 28 minutes including:  IPJ extension stretch 2 min each:  - Size C LMB worn targeting DIP extension    1 min each:  - Size B LMB worn for check out PIP extension  - Finger gutter extension positioned with silipose and straps adjusted   Intrinsic strengthening 2 min each:  - Spreads, within PIP clam shell splint, targeting DIP extension   IPJ flexion stretch 2 min each:  - Flexion strap worn x 3 min  - defer-Coban flexion wrap to PIP and DIP Concurrent with Paraffin wax x 8 minutes    Extrinsic flexor gliding and  strengthening 2 min each:  - 2 cm hook to full fist " rolls  - Finger platter at DIP  - PIP clam shell worn for targeting FDP glide, scooping medium pompoms  - PIP clam shell worn for targeting FDP strengthening using double yellow spidyband in isometric, best flexion position  - Medium , med/small size selected       HEP as set & upgraded 2/28/22: Added ORL stretch w/PIP clam shell. Standard band or double/triple yellow band for strengthening intrinsics and FDP.  Green t-putty   Objective Measures 2/28/22: R/A LF KATZ and TPM     MUSHTAQ received Self Care instructions and Orthosis Management for 10 minutes and participated in a concurrent discussion of re/evaluation findings and specific home care, including:  - ongoing education of the anatomy and pathophysiology of the tissue involvement.    ORTHOTIC DESCRIPTION MODIFICATIONS, CURRENT WEAR SCHEDULE INSTRUCTIONS   Flexion strap 2/28/22: Check out fit and effectiveness  Wear schedule: increase in 2 minute increments to target 30 min, 3x/day   IPJ static progressive extension Gutter splint 2/28/22: Check out fit and comfort;   Wear schedule; targeting night time only; size L/XL Silipose to be worn beneath the splint   PIP Clam Shell Splint 2/28/22: Modified the DIP splint for PIP extension  Wear schedule; only during ORL stretch and DIPJ active/active assistive joint blocking and strengthening   DIP Clam Shell Splint 2/28: Discontinued at this time   LMB  2/28/22: Check out Size C LMB for fit and effectiveness for PIP/DIP extension stretch  Wear schedule: increase wear tolerance in 2 minute increments; not to exceed 10 minutes 3x/day.  2/28/22: Check out Size B LMB for fit and effectiveness for PIP extension (volar plate) stretch  Wear schedule currently 10 minutes 3x a day       Home Exercise Program and Home Care Resources/Education:     Education provided:   -  HEP in place w/green t-putty and as upgraded with session  - Education provided on all interventions performed during today's session   - Progress  towards goals    Written Home Exercises Provided: Patient instructed to cont prior HEP.  Exercises were reviewed and MUSHTAQ was able to demonstrate them prior to the end of the session.  MUSHTAQ demonstrated good  understanding of the HEP provided.     See EMR under Patient Instructions for exercises provided 1/26/2022, 1/31/22, 2/3/22, 2/28/22       Assessment   2/28: QuickDASH translated by family today. This indicates decreased ability to perform I/ADLs and work tasks. Loss of KATZ due to DIP extension weakness and/or scar adhesions limiting terminal tendon excursion. Post tx TPM improvements due to increased DIP extension and MCP flexion. Tx program and HEP upgraded accordingly.     Goals:   LTG's (8-10 weeks):  1)   Long (Middle) Finger AKTZ to be > 80% of the unaffected hand to increase functional hand use for household I/ADLs.  2)    strength  to be >50% of the unaffected hand for safe handling of work tools and materials. MET 2/10/22  3)   Pinch strength  to be >60% of the unaffected hand for safe handling of locks, latches and fasteners. MET 2/10/22  4)   Decrease complaints of pain to  2 out of 10 at worst to increase functional hand use for ADL/work/leisure activities.  5)  Dexterity to be >80% of the unaffected hand as evidenced by Grooved Peg Board test or patient report of (I) management of fasteners and customary self care containers.  6)   Patient to score at 25% or less on DASH or FOTO to demonstrate improved perception of functional left hand/UE use for return to near to prior level of function for I/ADLs and work. Progressing     STG's (4-6 weeks)  1)   Patient to be IND with HEP and orthotic use/wear/care and precautions. ONGOING  2)   Pt to tolerate advancing PREs with self-graded intensity and effective symptom monitoring. MET  3)   Long (Middle) Finger  KATZ to be increased by 50 degrees for reactivation of the affected hand performing light bilateral ADLs. MET  4)  Morena's score to  be >40% to indicate effective prevention of scar adhesions and optimum glide of tendons. Progressing  5)   Edema to be decreased by .2 to .4 cm to evidence effective pain and edema management. MET  6)   Decrease complaints of pain to  3 out of 10 at worst while performing light stabilization tasks. Progressing    Pt would continue to benefit from skilled OT as per original POC.     MUSHTAQ is progressing as expected towards his goals and there are no updates to goals at this time. Pt prognosis is Good.     Pt will continue to benefit from skilled outpatient occupational therapy to address the deficits listed in the problem list on initial evaluation provide pt/family education and to maximize pt's level of independence in the home and community environment.     Pt's spiritual, cultural and educational needs considered and pt agreeable to plan of care and goals.    Plan   2/28: Assess baseline dexterity with Grooved Pegs. R/A  and pinch. Continue PREs emphasizing intrinsic and isolated FDP strengthening. Advance flexibility and splints prn. Upgrade POC next session if plateau has not been reached.    Pt to be treated by Occupational Therapy 2-3 times per week for 10-12 weeks during the certification period from 11/2/2021 to 03/02/2022 to achieve the established goals.      Treatment to include: Paraffin, Fluidotherapy, Manual therapy/joint mobilizations, Modalities for pain management, Therapeutic exercises/activities., Strengthening, Orthotic Fabrication/Fit/Training, Edema Control, Scar Management, Wound Care and Electrical Modalities, as well as any other treatments deemed necessary based on the patient's needs or progress.    YOSSI Bailey, T  Occupational Therapist, Certified Hand Therapist

## 2022-02-28 ENCOUNTER — CLINICAL SUPPORT (OUTPATIENT)
Dept: REHABILITATION | Facility: HOSPITAL | Age: 47
End: 2022-02-28
Payer: COMMERCIAL

## 2022-02-28 DIAGNOSIS — M25.642 DECREASED RANGE OF MOTION OF FINGER OF LEFT HAND: ICD-10-CM

## 2022-02-28 DIAGNOSIS — M79.89 SWELLING OF LEFT MIDDLE FINGER: ICD-10-CM

## 2022-02-28 DIAGNOSIS — M79.645 PAIN OF LEFT MIDDLE FINGER: Primary | ICD-10-CM

## 2022-02-28 PROCEDURE — 97110 THERAPEUTIC EXERCISES: CPT | Mod: PN

## 2022-02-28 PROCEDURE — 97530 THERAPEUTIC ACTIVITIES: CPT | Mod: PN

## 2022-02-28 PROCEDURE — 97140 MANUAL THERAPY 1/> REGIONS: CPT | Mod: PN

## 2022-02-28 PROCEDURE — 97022 WHIRLPOOL THERAPY: CPT | Mod: PN

## 2022-02-28 NOTE — PATIENT INSTRUCTIONS
[facility], O[facility]UPATIONAL [facility]ERAPY  HOME EXERCISE PROGRAM       THESE ARE FOR YOUR MIDDLE (LONG) FINGER:

## 2022-03-04 NOTE — PROGRESS NOTES
Occupational Therapy Updated Plan of Care and Daily Treatment Note     Date: 3/7/2022  Name: Armen Mcconnell  [facility] Number: [id number]    Therapy Diagnosis:   Encounter Diagnoses   Name Primary?    Pain of left middle finger Yes    Decreased range of motion of finger of left hand     Swelling of left middle finger      Physician: Pratik Jain Jr., *    Medical Diagnosis: M86.9 (ICD-10-CM) - Osteomyelitis, unspecified site, unspecified type  S56.129A,S61.209A (ICD-10-CM) - Flexor tendon laceration of finger with open wound, initial encounter  Physician Orders: OT Eval & Tx; see 12/30/21 Plan below  Evaluation Date: 11/2/2021  Insurance Authorization Period Expiration: 12/31/2021    Updated Plan of Care: 03/07/2022 to 05/07/2022  Plan of Care from 11/2/2021 to 03/02/2022    Surgery Date: 11/13/21 - Operative procedure:  Debridement excisional of bone left middle finger middle phalanx and distal phalanx including biopsy.  10/8/21 - Operative Procedure: 1. REPAIR OF FLEXOR DIGITORUM PROFUNDUS TENDON LACERATION LEFT MIDDLE FINGER.   2. REPAIR OF DIGITAL NERVE LACERATION X 2 LEFT MIDDLE FINGER.  Return to MD: 3/10/22    Visit # / Visits authorized: 8 / 50  QuickDA 2/28/22 = 34%, Work Module = 75%    Total Visits Received: 13  Cancelled Visits: 5 (transportation & therapist changes)  No Show Visits: 1    Time In: 3:00 pm   Time Out: 4:00 pm  Total Billable Time: 50 minutes    Precautions:  Standard; see order specifics  Other considerations: Primary language is Yakut. Wife (Significant Other) is fluent in English and accompanies him for interpretation.   Family refused interpretation services offered on 2/3/22 explaining that they prefer family to interpret.  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    Subjective     Pt reports: 3/7: Patient states that his finger has pain during work hours, but during the weekend when he is not working, the pain resolves. Patient  reports PIP extension splint causes pain for about 1 hour after he takes it off in the morning. Cushion adjusted on the splint, and education provided on the scar shortening during the day time, making the PIP extension splint feel uncomfortable at night; patient leaves reporting a comfortable fit of splint.     Response to previous treatment: ROM improvements with commitment to modified HEP.    Functional Improvements: See QuickDASH. Patient reports increased ability to open the car door. He continues to be limited at work for tasks requiring resistive gripping and pinching due to pain.    Pain: 3/10 pain at rest 5/10 pain when engaging in work tasks   5/10 at worst; 0/10 at rest  Location: Left LF    Objective   Observations: increased stiffness in left LF DIP joint    Edema: Circumferential measurements: In centimters     IE-11/2/21 11/10/21 1/6/22-  Resumed OT 1/31/22 2/2/22 2/14/22 2/23/22     Right/Left L L L L L L   Long (Middle) Finger :               P1 7.1/8.7 7.8 (-.9) 7.1 7.3 (-0.2) 6.9 (-0.4) 7.0 (+0.1) 6.8 (-0.2)    PIP 7.4/9.4 8.6 (-.8) 7.8 7.9 (+0.1) 7.6 (-0.3) 7.5 (-0.1) 7.5 (=)   P2     7.1 6.5 (-0.6) 6.7 (+0.2) 6.4 (-0.3) 6.6 (-0.2)    DIP     6.2 6.1 (-0.1) 5.8 (-0.3) 5.6 (-0.2) 5.6 (=)   P3     5.6 nt         Range of Motion:  Long (Middle) Finger AROM    Left Left Left Left Left Left Left   DATE IE-11/2/21 11/10/21 1/6/22-  Resumed OT 1/26/22  Post-tx 2/23/22 2/28/22 3/7/22  Post-tx   MP e/f -17/70 0/= 0/96 0/97 103 102 101   PIP e/f -23/29 -7/50 -10/78 -11/82 -11/86 -8/84 -7/86   DIP e/f -18/20 -13/34 -21/25 -20/29 -17/28 -20/28 -18/31   Morena's % 5% 37%     48%    KATZ 61 156 (+95) 168 (+12) 177 (+21) 189 (+12) 186 (-3) 193 (+7)    * (-15 post LMB x 2')    Long (Middle) Finger PROM    Left Left Left Left Left   DATE 2/3/22 2/14/22 2/28/22  Post-tx 3/7/22  Pre-tx 3/7/22  Post-tx   MP e/f defer 110 112 112 nt   PIP e/f -7/84 -6/89 -6/89 -10/90 -7/nt   DIP e/f -8/35 -11/45 -9/44 -10/50 -7/nt  "  TPM defer 227 230 (+3) 232 nt     AROM (measured in centimeters)  Tip to DPC Right/Left Left Left   DATE IE-11/8/2021 1/6/22-  Resumed OT 2/28/22   Index  4.0 Touching palm  (=)   Ring   4.0 1 0 (+1.0)   Small   4.0 2.5 0 (+2.5)   Thumb TBA         Sensation: N/A Distribution    1/26/2022     LEFT   Monofilament Testing     Normal 1.65-2.83 IF, RF, SF & Thumb   Diminished Light Touch 3.22-3.61 LF ulna tip   Diminished Protective 3.84-4.31 LF radial tip   Loss of Protective 4.56-6.65     Untestable >6.65        Strength: (measured in psi.)    Right/Left L Left    2/3/2022 2/10/22 3/7/22   Brigido Rung /54 114/73 (+19) 74 (+1)   Ariza Pinch 20/17 20 (+3) 22 (+2)   3-pt Pinch 24/12 16.5 (+4.5) 15.5 (-1.0)       Dexterity Tests  (Measured in minutes)     Right Left   DATE 3/7/22   3/7/22   Grooved Peg board timed 1'29" 1'57"     Treatment     MUSHTAQ received the following supervised modalities after being cleared for contraindications for 8 minutes:   -defer-Fluidotherapy for promoting healing, reducing pain, desensitization and increasing tissue extensibility  -Paraffin wax heat pre-tx for promoting healing, decreasing pain and increasing tissue extensibility concurrent with wearing Size C LMB     MUSHTAQ received the following direct contact modalities after being cleared for contraindications for 7 minutes:  - U/S @ .7 w/cm2 using 1cm soundhead @ 3.3Mhz; volar & dorsal Left LF DIP & PIP    MUSHTAQ received the following manual therapy techniques for 20 minutes:   - Scar massage w/dycem assistance  - defer- Fit and issued size Lg/XLg Silipose sleeve, instructions to wear beneath night-time IPJ extension splint  - IASTM concave on convex volar surface of left LF post heat & U/S  - defer-Grade III-IV DIP mobilizations  - defer-AA/PROM FDS glides  - defer-AA/PROM DIP flexion   - PROM ORL stretch wearing PIP blocking clam-shell splint  - defer-R/A girth; defer-checked wear schedule of size XXL digitsleeve " "for daytime and 1" compression sleeve for night time wear for edema mgmt  - AA/PROM DIP Extension    MUSHTAQ performed therapeutic activities and exercises for 10 minutes including:  IPJ extension stretch Size C LMB worn across PIP and DIP concurrent with paraffin wax x 8 minutes    2 min each:  - defer-Size C LMB worn targeting DIP extension    1 min each:  - Size B LMB worn for check out PIP extension; current wear time is 10-12 minutes 3x a day  - Finger gutter extension, adjusted cushion under DIP joint; wear with  silipose 2/28: straps adjusted   Intrinsic strengthening-defer 2 min each:  - Spreads, within PIP clam shell splint, targeting DIP extension   IPJ flexion stretch-defer 2 min each:  - Flexion strap worn x 3 min  - Coban flexion wrap to PIP and DIP Concurrent with Paraffin wax x 8 minutes    Extrinsic flexor gliding and  Strengthening- defer 2 min each:  - 2 cm hook to full fist rolls  - Finger platter at DIP  - PIP clam shell worn for targeting FDP glide, scooping medium pompoms  - PIP clam shell worn for targeting FDP strengthening using double yellow spidyband in isometric, best flexion position  - Medium , med/small size selected       HEP as set & upgraded 2/28/22: Added ORL stretch w/PIP clam shell. Standard band or double/triple yellow band for strengthening intrinsics and FDP.  Green t-putty for all exercises except using blue t-putty for composite flexion   Objective Measures 3/7/22: R/A LF TPM pre-tx, KATZ post- tx,  and pinch strength, and baseline dexterity   2/28/22: R/A LF KATZ and TPM     MUSHTAQ received Self Care instructions and Orthosis Management for 5 minutes and participated in a concurrent discussion of re/evaluation findings and specific home care, including:  - ongoing education of the anatomy and pathophysiology of the tissue involvement  - Scar formation     ORTHOTIC DESCRIPTION MODIFICATIONS, CURRENT WEAR SCHEDULE INSTRUCTIONS   Flexion strap 2/28/22: Check out fit " and effectiveness  Wear schedule: increase in 2 minute increments to target 30 min, 3x/day   IPJ static progressive extension Gutter splint 3/7: Checked fit and adjusted cushion at DIP position  2/28/22: Check out fit and comfort;   Wear schedule; targeting night time only; size L/XL Silipose to be worn beneath the splint   PIP Clam Shell Splint 3/7: Checked fit and worn during ORL stretch  2/28/22: Modified the DIP splint for PIP extension  Wear schedule; only during ORL stretch and DIPJ active/active assistive joint blocking and strengthening   DIP Clam Shell Splint 2/28: Discontinued at this time   LMB  3/7/22,2/28/22: Check out Size C LMB for fit and effectiveness for PIP/DIP extension stretch  Wear schedule: increase wear tolerance in 2 minute increments; not to exceed 10 minutes 3x/day.  3/7/22, 2/28/22: Check out Size B LMB for fit and effectiveness for PIP extension (volar plate) stretch  Wear schedule currently 10-12 minutes 3x a day       Home Exercise Program and Home Care Resources/Education:     Education provided:   -  HEP in place w/ blue t-putty for composite flexion exercise, green t-putty for remainder of t-putty exercises   - Education provided on all interventions performed during today's session   - Progress towards goals    Written Home Exercises Provided: Patient instructed to cont prior HEP.  Exercises were reviewed and MUSHTAQ was able to demonstrate them prior to the end of the session.  MUSHTAQ demonstrated good  understanding of the HEP provided.     See EMR under Patient Instructions for exercises provided 1/26/2022, 1/31/22, 2/3/22, 2/28/22       Assessment   Reasons for Recertification of Therapy:  Mushtaq continues to make improvements in AROM and strength. Further potential for AROM and strength improvements exists with patient commitment to OT attendance and HEP.      Updated Goals:  3/7/22 LTG's (8-10 weeks):  1)   Long (Middle) Finger KATZ to be > 80% of the unaffected hand  to increase functional hand use for household I/ADLs. Progressing  2)  3/7/22: Updated to be >75% strength  to be >50% of the unaffected hand for safe handling of work tools and materials. MET 2/10/22  3)   Pinch strength to be >60% of the unaffected hand for safe handling of locks, latches and fasteners. MET 2/10/22  4)   Decrease complaints of pain to  2 out of 10 at worst to increase functional hand use for ADL/work/leisure activities. Progressing  5) 3/7/22: Upgraded to be >85% Dexterity to be >80% of the unaffected hand as evidenced by Grooved Peg Board test or patient report of (I) management of fasteners and customary self care containers. Progressing at 76% at 3/7/22   6)   Patient to score at 25% or less on DASH or FOTO to demonstrate improved perception of functional left hand/UE use for return to near to prior level of function for I/ADLs and work. Progressing     STG's (4-6 weeks)  1)   Patient to be IND with HEP and orthotic use/wear/care and precautions. MET  2)   Pt to tolerate advancing PREs with self-graded intensity and effective symptom monitoring. MET  3)   Long (Middle) Finger  KATZ to be increased by 50 degrees for reactivation of the affected hand performing light bilateral ADLs. MET  4)  Berg's score to be >40% to indicate effective prevention of scar adhesions and optimum glide of tendons. Progressing  5)   Edema to be decreased by .2 to .4 cm to evidence effective pain and edema management. MET  6)   Decrease complaints of pain to  3 out of 10 at worst while performing light stabilization tasks. Progressing    Pt would continue to benefit from skilled OT as per original POC.     MUSHTAQ is progressing as expected towards his goals and there are no updates to goals at this time. Pt prognosis is Good.     Pt will continue to benefit from skilled outpatient occupational therapy to address the deficits listed in the problem list on initial evaluation provide pt/family education and  "to maximize pt's level of independence in the home and community environment.     Pt's spiritual, cultural and educational needs considered and pt agreeable to plan of care and goals.    Plan   3/7: R/A girth. R/A LF TPM and KATZ. Continue scar management. Continue PREs emphasizing intrinsic and isolated FDP strengthening. Advance flexibility and splints prn. Upgrade POC next session if plateau has not been reached. Continue with updated POC.    Pt to be treated by Occupational Therapy 1-2 times per week for 4-6 weeks during the certification period from 03/07/2022 to 4/18/2022 to achieve the established goals.      Treatment to include: Paraffin, Fluidotherapy, Manual therapy/joint mobilizations, Modalities for pain management, Therapeutic exercises/activities., Strengthening, Orthotic Fabrication/Fit/Training, Edema Control, Scar Management, Wound Care and Electrical Modalities, as well as any other treatments deemed necessary based on the patient's needs or progress.    TAYLOR Erickson  Student Occupational Therapist    "I, YOSSI Montgomery CHT,  certify that I was present in the room directing the student in service delivery and guiding them using my skilled judgment. As the co-signing therapist, I have reviewed the student's documentation and am responsible for the treatment, assessment and plan."    YOSSI Bailey CHT  Occupational Therapist, Certified Hand Therapist      I CERTIFY THE NEED FOR THESE SERVICES FURNISHED UNDER THIS PLAN OF TREATMENT AND WHILE UNDER MY CARE    Physician's comments:        Physician's Signature: ___________________________________________________    "

## 2022-03-07 ENCOUNTER — CLINICAL SUPPORT (OUTPATIENT)
Dept: REHABILITATION | Facility: HOSPITAL | Age: 47
End: 2022-03-07
Payer: COMMERCIAL

## 2022-03-07 DIAGNOSIS — M79.645 PAIN OF LEFT MIDDLE FINGER: Primary | ICD-10-CM

## 2022-03-07 DIAGNOSIS — M79.89 SWELLING OF LEFT MIDDLE FINGER: ICD-10-CM

## 2022-03-07 DIAGNOSIS — M25.642 DECREASED RANGE OF MOTION OF FINGER OF LEFT HAND: ICD-10-CM

## 2022-03-07 PROCEDURE — 97110 THERAPEUTIC EXERCISES: CPT | Mod: PN

## 2022-03-07 PROCEDURE — 97018 PARAFFIN BATH THERAPY: CPT | Mod: PN

## 2022-03-07 PROCEDURE — 97035 APP MDLTY 1+ULTRASOUND EA 15: CPT | Mod: PN

## 2022-03-07 PROCEDURE — 97140 MANUAL THERAPY 1/> REGIONS: CPT | Mod: PN

## 2022-03-08 NOTE — PLAN OF CARE
Occupational Therapy Updated Plan of Care and Daily Treatment Note     Date: 3/7/2022  Name: Armen Mcconnell  [facility] Number: [id number]    Therapy Diagnosis:   Encounter Diagnoses   Name Primary?    Pain of left middle finger Yes    Decreased range of motion of finger of left hand     Swelling of left middle finger      Physician: Pratik Jain Jr., *    Medical Diagnosis: M86.9 (ICD-10-CM) - Osteomyelitis, unspecified site, unspecified type  S56.129A,S61.209A (ICD-10-CM) - Flexor tendon laceration of finger with open wound, initial encounter  Physician Orders: OT Eval & Tx; see 12/30/21 Plan below  Evaluation Date: 11/2/2021  Insurance Authorization Period Expiration: 12/31/2021    Updated Plan of Care: 03/07/2022 to 05/07/2022  Plan of Care from 11/2/2021 to 03/02/2022    Surgery Date: 11/13/21 - Operative procedure:  Debridement excisional of bone left middle finger middle phalanx and distal phalanx including biopsy.  10/8/21 - Operative Procedure: 1. REPAIR OF FLEXOR DIGITORUM PROFUNDUS TENDON LACERATION LEFT MIDDLE FINGER.   2. REPAIR OF DIGITAL NERVE LACERATION X 2 LEFT MIDDLE FINGER.  Return to MD: 3/10/22    Visit # / Visits authorized: 8 / 50  QuickDA 2/28/22 = 34%, Work Module = 75%    Total Visits Received: 13  Cancelled Visits: 5 (transportation & therapist changes)  No Show Visits: 1    Time In: 3:00 pm   Time Out: 4:00 pm  Total Billable Time: 50 minutes    Precautions:  Standard; see order specifics  Other considerations: Primary language is Maltese. Wife (Significant Other) is fluent in English and accompanies him for interpretation.   Family refused interpretation services offered on 2/3/22 explaining that they prefer family to interpret.  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    Subjective     Pt reports: 3/7: Patient states that his finger has pain during work hours, but during the weekend when he is not working, the pain resolves. Patient  reports PIP extension splint causes pain for about 1 hour after he takes it off in the morning. Cushion adjusted on the splint, and education provided on the scar shortening during the day time, making the PIP extension splint feel uncomfortable at night; patient leaves reporting a comfortable fit of splint.     Response to previous treatment: ROM improvements with commitment to modified HEP.    Functional Improvements: See QuickDASH. Patient reports increased ability to open the car door. He continues to be limited at work for tasks requiring resistive gripping and pinching due to pain.    Pain: 3/10 pain at rest 5/10 pain when engaging in work tasks   5/10 at worst; 0/10 at rest  Location: Left LF    Objective   Observations: increased stiffness in left LF DIP joint    Edema: Circumferential measurements: In centimters     IE-11/2/21 11/10/21 1/6/22-  Resumed OT 1/31/22 2/2/22 2/14/22 2/23/22     Right/Left L L L L L L   Long (Middle) Finger :               P1 7.1/8.7 7.8 (-.9) 7.1 7.3 (-0.2) 6.9 (-0.4) 7.0 (+0.1) 6.8 (-0.2)    PIP 7.4/9.4 8.6 (-.8) 7.8 7.9 (+0.1) 7.6 (-0.3) 7.5 (-0.1) 7.5 (=)   P2     7.1 6.5 (-0.6) 6.7 (+0.2) 6.4 (-0.3) 6.6 (-0.2)    DIP     6.2 6.1 (-0.1) 5.8 (-0.3) 5.6 (-0.2) 5.6 (=)   P3     5.6 nt         Range of Motion:  Long (Middle) Finger AROM    Left Left Left Left Left Left Left   DATE IE-11/2/21 11/10/21 1/6/22-  Resumed OT 1/26/22  Post-tx 2/23/22 2/28/22 3/7/22  Post-tx   MP e/f -17/70 0/= 0/96 0/97 103 102 101   PIP e/f -23/29 -7/50 -10/78 -11/82 -11/86 -8/84 -7/86   DIP e/f -18/20 -13/34 -21/25 -20/29 -17/28 -20/28 -18/31   Morena's % 5% 37%     48%    KATZ 61 156 (+95) 168 (+12) 177 (+21) 189 (+12) 186 (-3) 193 (+7)    * (-15 post LMB x 2')    Long (Middle) Finger PROM    Left Left Left Left Left   DATE 2/3/22 2/14/22 2/28/22  Post-tx 3/7/22  Pre-tx 3/7/22  Post-tx   MP e/f defer 110 112 112 nt   PIP e/f -7/84 -6/89 -6/89 -10/90 -7/nt   DIP e/f -8/35 -11/45 -9/44 -10/50 -7/nt  "  TPM defer 227 230 (+3) 232 nt     AROM (measured in centimeters)  Tip to DPC Right/Left Left Left   DATE IE-11/8/2021 1/6/22-  Resumed OT 2/28/22   Index  4.0 Touching palm  (=)   Ring   4.0 1 0 (+1.0)   Small   4.0 2.5 0 (+2.5)   Thumb TBA         Sensation: N/A Distribution    1/26/2022     LEFT   Monofilament Testing     Normal 1.65-2.83 IF, RF, SF & Thumb   Diminished Light Touch 3.22-3.61 LF ulna tip   Diminished Protective 3.84-4.31 LF radial tip   Loss of Protective 4.56-6.65     Untestable >6.65        Strength: (measured in psi.)    Right/Left L Left    2/3/2022 2/10/22 3/7/22   Brigido Rung /54 114/73 (+19) 74 (+1)   Ariza Pinch 20/17 20 (+3) 22 (+2)   3-pt Pinch 24/12 16.5 (+4.5) 15.5 (-1.0)       Dexterity Tests  (Measured in minutes)     Right Left   DATE 3/7/22   3/7/22   Grooved Peg board timed 1'29" 1'57"     Home Exercise Program and Home Care Resources/Education:     Education provided:   -  HEP in place w/ blue t-putty for composite flexion exercise, green t-putty for remainder of t-putty exercises   - Education provided on all interventions performed during today's session   - Progress towards goals    Written Home Exercises Provided: Patient instructed to cont prior HEP.  Exercises were reviewed and MUSHTAQ was able to demonstrate them prior to the end of the session.  MUSHTAQ demonstrated good  understanding of the HEP provided.     See EMR under Patient Instructions for exercises provided 1/26/2022, 1/31/22, 2/3/22, 2/28/22       Assessment   Reasons for Recertification of Therapy:  Mushtaq continues to make improvements in AROM and strength. Further potential for AROM and strength improvements exists with patient commitment to OT attendance and HEP.    3/7/22: Improvements as expected with HEP follow through as instructed last session.    Updated Goals:  3/7/22 LTG's (8-10 weeks):  1)   Long (Middle) Finger KATZ to be > 80% of the unaffected hand to increase functional hand use " for household I/ADLs. Progressing  2)  3/7/22: Updated to be >75% strength  to be >50% of the unaffected hand for safe handling of work tools and materials. MET 2/10/22  3)   Pinch strength to be >60% of the unaffected hand for safe handling of locks, latches and fasteners. MET 2/10/22  4)   Decrease complaints of pain to  2 out of 10 at worst to increase functional hand use for ADL/work/leisure activities. Progressing  5) 3/7/22: Upgraded to be >85% Dexterity to be >80% of the unaffected hand as evidenced by Grooved Peg Board test or patient report of (I) management of fasteners and customary self care containers. Progressing at 76% at 3/7/22   6)   Patient to score at 25% or less on DASH or FOTO to demonstrate improved perception of functional left hand/UE use for return to near to prior level of function for I/ADLs and work. Progressing     STG's (4-6 weeks)  1)   Patient to be IND with HEP and orthotic use/wear/care and precautions. MET  2)   Pt to tolerate advancing PREs with self-graded intensity and effective symptom monitoring. MET  3)   Long (Middle) Finger  KATZ to be increased by 50 degrees for reactivation of the affected hand performing light bilateral ADLs. MET  4)  Berg's score to be >40% to indicate effective prevention of scar adhesions and optimum glide of tendons. MET  5)   Edema to be decreased by .2 to .4 cm to evidence effective pain and edema management. MET  6)   Decrease complaints of pain to  3 out of 10 at worst while performing light stabilization tasks. MET    Pt would continue to benefit from skilled OT as per original POC.     MUSHTAQ is progressing as expected towards his goals and there are no updates to goals at this time. Pt prognosis is Good.     Pt will continue to benefit from skilled outpatient occupational therapy to address the deficits listed in the problem list on initial evaluation provide pt/family education and to maximize pt's level of independence in the  "home and community environment.     Pt's spiritual, cultural and educational needs considered and pt agreeable to plan of care and goals.    Plan   3/7: R/A girth. R/A LF TPM and KATZ. Continue scar management. Continue PREs emphasizing intrinsic and isolated FDP strengthening. Advance flexibility and splints prn. Upgrade POC next session if plateau has not been reached. Continue with updated POC.    Pt to be treated by Occupational Therapy 1-2 times per week for 4-6 weeks during the certification period from 03/07/2022 to 4/18/2022 to achieve the established goals.      Treatment to include: Paraffin, Fluidotherapy, Manual therapy/joint mobilizations, Modalities for pain management, Therapeutic exercises/activities., Strengthening, Orthotic Fabrication/Fit/Training, Edema Control, Scar Management, Wound Care and Electrical Modalities, as well as any other treatments deemed necessary based on the patient's needs or progress.    TAYLOR Erickson  Student Occupational Therapist    "I, YOSSI Montgomery CHT,  certify that I was present in the room directing the student in service delivery and guiding them using my skilled judgment. As the co-signing therapist, I have reviewed the student's documentation and am responsible for the treatment, assessment and plan."    YOSSI Bailey CHT  Occupational Therapist, Certified Hand Therapist      I CERTIFY THE NEED FOR THESE SERVICES FURNISHED UNDER THIS PLAN OF TREATMENT AND WHILE UNDER MY CARE    Physician's comments:        Physician's Signature: ___________________________________________________      "

## 2022-03-10 ENCOUNTER — OFFICE VISIT (OUTPATIENT)
Dept: ORTHOPEDICS | Facility: CLINIC | Age: 47
End: 2022-03-10
Payer: COMMERCIAL

## 2022-03-10 VITALS — HEIGHT: 67 IN | WEIGHT: 161 LBS | BODY MASS INDEX: 25.27 KG/M2

## 2022-03-10 DIAGNOSIS — M86.9 OSTEOMYELITIS, UNSPECIFIED SITE, UNSPECIFIED TYPE: Primary | ICD-10-CM

## 2022-03-10 PROCEDURE — 1159F MED LIST DOCD IN RCRD: CPT | Mod: CPTII,S$GLB,, | Performed by: ORTHOPAEDIC SURGERY

## 2022-03-10 PROCEDURE — 1159F PR MEDICATION LIST DOCUMENTED IN MEDICAL RECORD: ICD-10-PCS | Mod: CPTII,S$GLB,, | Performed by: ORTHOPAEDIC SURGERY

## 2022-03-10 PROCEDURE — 3008F BODY MASS INDEX DOCD: CPT | Mod: CPTII,S$GLB,, | Performed by: ORTHOPAEDIC SURGERY

## 2022-03-10 PROCEDURE — 99213 PR OFFICE/OUTPT VISIT, EST, LEVL III, 20-29 MIN: ICD-10-PCS | Mod: S$GLB,,, | Performed by: ORTHOPAEDIC SURGERY

## 2022-03-10 PROCEDURE — 99999 PR PBB SHADOW E&M-EST. PATIENT-LVL III: ICD-10-PCS | Mod: PBBFAC,,, | Performed by: ORTHOPAEDIC SURGERY

## 2022-03-10 PROCEDURE — 3008F PR BODY MASS INDEX (BMI) DOCUMENTED: ICD-10-PCS | Mod: CPTII,S$GLB,, | Performed by: ORTHOPAEDIC SURGERY

## 2022-03-10 PROCEDURE — 99999 PR PBB SHADOW E&M-EST. PATIENT-LVL III: CPT | Mod: PBBFAC,,, | Performed by: ORTHOPAEDIC SURGERY

## 2022-03-10 PROCEDURE — 99213 OFFICE O/P EST LOW 20 MIN: CPT | Mod: S$GLB,,, | Performed by: ORTHOPAEDIC SURGERY

## 2022-03-10 NOTE — PROGRESS NOTES
Subjective:      Patient ID: Armen Mcconnell is a 47 y.o. male.  Chief Complaint: Follow-up (Left middle finger (osteomyelitis))      HPI  Armen Mcconnell is a  47 y.o. male presenting today for follow up of I&D osteomyelitis of the left middle finger noon.  He reports that he is now about 4 months postop is doing quite well currently in therapy pain is minimal he is back doing most of his regular activities including lifting.    Review of patient's allergies indicates:  No Known Allergies      Current Outpatient Medications   Medication Sig Dispense Refill    clotrimazole-betamethasone 1-0.05% (LOTRISONE) cream Apply topically 2 (two) times daily. 1 each 1    levothyroxine (SYNTHROID) 88 MCG tablet Take 88 mcg by mouth before breakfast.      ibuprofen (ADVIL,MOTRIN) 600 MG tablet Take 1 tablet (600 mg total) by mouth 3 (three) times daily with meals. (Patient not taking: Reported on 3/10/2022) 60 tablet 1    ibuprofen (ADVIL,MOTRIN) 800 MG tablet TAKE 1 TABLET (800 MG TOTAL) BY MOUTH 2 TIMES DAILY WITH MEALS (Patient not taking: Reported on 3/10/2022) 60 tablet 1    oxyCODONE-acetaminophen (PERCOCET) 7.5-325 mg per tablet Take 1 tablet by mouth every 4 (four) hours as needed for Pain. (Patient not taking: Reported on 3/10/2022) 28 tablet 0     No current facility-administered medications for this visit.       Past Medical History:   Diagnosis Date    Thyroid disease        Past Surgical History:   Procedure Laterality Date    FLEXOR TENDON REPAIR Left 10/8/2021    Procedure: REPAIR, TENDON, FLEXOR;  Surgeon: Pratik Jain Jr., MD;  Location: [facility] OR;  Service: Orthopedics;  Laterality: Left;  need supramid 4-0 suture ([facility])    INCISION AND DRAINAGE OF HAND Left 11/13/2021    Procedure: INCISION AND DRAINAGE, HAND;  Surgeon: Pratik Jain Jr., MD;  Location: [facility] OR;  Service: Orthopedics;  Laterality: Left;    REPAIR OF NERVE OF FINGER Left 10/8/2021    Procedure: REPAIR, NERVE,  "FINGER;  Surgeon: Pratik Jain Jr., MD;  Location: [facility];  Service: Orthopedics;  Laterality: Left;  need 8-0 nylon and micro instruments       OBJECTIVE:   PHYSICAL EXAM:  Height: 5' 7" (170.2 cm) Weight: 73 kg (161 lb)  Vitals:    03/10/22 1019   Weight: 73 kg (161 lb)   Height: 5' 7" (1.702 m)   PainSc: 0-No pain     Ortho/SPM Exam  Examination left hand the middle finger looks good slight swelling no tenderness no redness no open wound range of motion is pretty good he does have some limited flexion of the PIP and D IP but overall much improved including strength    RADIOGRAPHS:  None  Comments: I have personally reviewed the imaging and I agree with the above radiologist's report.    ASSESSMENT/PLAN:     IMPRESSION:  Status post osteomyelitis left middle finger    PLAN:  Continue therapy continue squeeze ball advance to full activities Pennsaid for topical anti-inflammatory cream on the hand    FOLLOW UP:  2 months    Disclaimer: This note has been generated using voice-recognition software. There may be typographical errors that have been missed during proof-reading.    "

## (undated) DEVICE — SPONGE DERMACEA GAUZE 4X4

## (undated) DEVICE — GLOVE SURG BIOGEL LATEX SZ 7.5

## (undated) DEVICE — BANDAGE MATRIX HK LOOP 2IN 5YD

## (undated) DEVICE — SEE MEDLINE ITEM 157116

## (undated) DEVICE — BANDAGE ELASTIC 3X5 VELCRO ST

## (undated) DEVICE — SEE L#120831

## (undated) DEVICE — APPLICATOR CHLORAPREP ORN 26ML

## (undated) DEVICE — BANDAGE SOFFORM STER 2IN

## (undated) DEVICE — SUT ETHILON 5-0 PS-2 18IN

## (undated) DEVICE — STOCKINET TUBULAR 1 PLY 6X60IN

## (undated) DEVICE — SEE MEDLINE ITEM 154981

## (undated) DEVICE — GAUZE SPONGE 4X4 12PLY

## (undated) DEVICE — SUT 6/0 18IN PROLENE BL MO

## (undated) DEVICE — SEE MEDLINE ITEM 152622

## (undated) DEVICE — COVER OVERHEAD SURG LT BLUE

## (undated) DEVICE — PAD CAST 2 IN X 4YDS STERILE

## (undated) DEVICE — SEE MEDLINE ITEM 157173

## (undated) DEVICE — PACKING STRIP IDOFORM 1/4X5YD

## (undated) DEVICE — MANIFOLD 4 PORT

## (undated) DEVICE — SEE MEDLINE ITEM 157117

## (undated) DEVICE — PAD CAST SPECIALIST STRL 3

## (undated) DEVICE — BANDAGE ESMARK ELASTIC ST 4X9

## (undated) DEVICE — SUT VICRYL 3-0 27 SH

## (undated) DEVICE — STOCKINET 4INX48

## (undated) DEVICE — SUT 5 30IN ETHIBOND GRN BR

## (undated) DEVICE — SUT ETHIBOND XTRA 3-0 SH 30

## (undated) DEVICE — INSTRUMENT SUCTION FRAZIER 12F

## (undated) DEVICE — BLADE SURG #15 CARBON STEEL

## (undated) DEVICE — DRESSING XEROFORM FOIL PK 1X8

## (undated) DEVICE — PACK BASIC

## (undated) DEVICE — SYR B-D DISP CONTROL 10CC100/C

## (undated) DEVICE — PAD PREP 50/CA

## (undated) DEVICE — ALCOHOL 70% ISOP W/GREEN 16OZ

## (undated) DEVICE — SEE MEDLINE ITEM 152522

## (undated) DEVICE — BANDAGE MATRIX HK LOOP 3IN 5YD

## (undated) DEVICE — NDL HYPO REG 25G X 1 1/2

## (undated) DEVICE — BLADE SCALP OPHTL BEVEL STR

## (undated) DEVICE — ELECTRODE REM PLYHSV RETURN 9

## (undated) DEVICE — SUT FIBERWIRE 0 38 W/NDL

## (undated) DEVICE — SEE MEDLINE ITEM 156955

## (undated) DEVICE — BANDAGE ELASTIC 2X5 VELCRO ST

## (undated) DEVICE — GAUZE AVANT SPNG 4PLY STRL 4X4

## (undated) DEVICE — SUT 8/0 5IN ETHILON BLK MO